# Patient Record
Sex: MALE | Race: WHITE | Employment: FULL TIME | URBAN - METROPOLITAN AREA
[De-identification: names, ages, dates, MRNs, and addresses within clinical notes are randomized per-mention and may not be internally consistent; named-entity substitution may affect disease eponyms.]

---

## 2019-01-31 ENCOUNTER — HOSPITAL ENCOUNTER (INPATIENT)
Facility: CLINIC | Age: 29
LOS: 4 days | Discharge: HOME | DRG: 885 | End: 2019-02-04
Attending: EMERGENCY MEDICINE | Admitting: PSYCHIATRY & NEUROLOGY
Payer: COMMERCIAL

## 2019-01-31 DIAGNOSIS — T41.292A: ICD-10-CM

## 2019-01-31 DIAGNOSIS — T42.4X2A INTENTIONAL CHLORDIAZEPOXIDE OVERDOSE, INITIAL ENCOUNTER (H): ICD-10-CM

## 2019-01-31 DIAGNOSIS — T50.902A INTENTIONAL DRUG OVERDOSE, INITIAL ENCOUNTER (H): ICD-10-CM

## 2019-01-31 DIAGNOSIS — F13.10 BENZODIAZEPINE ABUSE (H): ICD-10-CM

## 2019-01-31 DIAGNOSIS — T51.0X2A: ICD-10-CM

## 2019-01-31 DIAGNOSIS — R94.31 ABNORMAL ELECTROCARDIOGRAM: Primary | ICD-10-CM

## 2019-01-31 DIAGNOSIS — F32.A DEPRESSION, UNSPECIFIED DEPRESSION TYPE: ICD-10-CM

## 2019-01-31 DIAGNOSIS — F19.10 ANTIDEPRESSANT TYPE ABUSE, CONTINUOUS (H): ICD-10-CM

## 2019-01-31 DIAGNOSIS — F19.10 POLYSUBSTANCE ABUSE (H): ICD-10-CM

## 2019-01-31 LAB
ALBUMIN SERPL-MCNC: 4.4 G/DL (ref 3.4–5)
ALCOHOL BREATH TEST: 0.06 (ref 0–0.01)
ALP SERPL-CCNC: 61 U/L (ref 40–150)
ALT SERPL W P-5'-P-CCNC: 36 U/L (ref 0–70)
AMPHETAMINES UR QL SCN: POSITIVE
ANION GAP SERPL CALCULATED.3IONS-SCNC: 10 MMOL/L (ref 3–14)
APAP SERPL-MCNC: <2 MG/L (ref 10–20)
AST SERPL W P-5'-P-CCNC: 32 U/L (ref 0–45)
BARBITURATES UR QL: NEGATIVE
BASOPHILS # BLD AUTO: 0.1 10E9/L (ref 0–0.2)
BASOPHILS NFR BLD AUTO: 0.9 %
BENZODIAZ UR QL: POSITIVE
BILIRUB SERPL-MCNC: 0.8 MG/DL (ref 0.2–1.3)
BUN SERPL-MCNC: 19 MG/DL (ref 7–30)
CALCIUM SERPL-MCNC: 9.1 MG/DL (ref 8.5–10.1)
CANNABINOIDS UR QL SCN: NEGATIVE
CHLORIDE SERPL-SCNC: 106 MMOL/L (ref 94–109)
CO2 SERPL-SCNC: 24 MMOL/L (ref 20–32)
COCAINE UR QL: NEGATIVE
CREAT SERPL-MCNC: 1.2 MG/DL (ref 0.66–1.25)
DIFFERENTIAL METHOD BLD: NORMAL
EOSINOPHIL # BLD AUTO: 0.2 10E9/L (ref 0–0.7)
EOSINOPHIL NFR BLD AUTO: 2.1 %
ERYTHROCYTE [DISTWIDTH] IN BLOOD BY AUTOMATED COUNT: 13.3 % (ref 10–15)
ETHANOL UR QL SCN: POSITIVE
GFR SERPL CREATININE-BSD FRML MDRD: 81 ML/MIN/{1.73_M2}
GLUCOSE SERPL-MCNC: 64 MG/DL (ref 70–99)
HCT VFR BLD AUTO: 46.1 % (ref 40–53)
HGB BLD-MCNC: 16.3 G/DL (ref 13.3–17.7)
IMM GRANULOCYTES # BLD: 0 10E9/L (ref 0–0.4)
IMM GRANULOCYTES NFR BLD: 0.1 %
INTERPRETATION ECG - MUSE: NORMAL
INTERPRETATION ECG - MUSE: NORMAL
LYMPHOCYTES # BLD AUTO: 3.6 10E9/L (ref 0.8–5.3)
LYMPHOCYTES NFR BLD AUTO: 42.9 %
MCH RBC QN AUTO: 28.8 PG (ref 26.5–33)
MCHC RBC AUTO-ENTMCNC: 35.4 G/DL (ref 31.5–36.5)
MCV RBC AUTO: 82 FL (ref 78–100)
MONOCYTES # BLD AUTO: 0.6 10E9/L (ref 0–1.3)
MONOCYTES NFR BLD AUTO: 7.2 %
NEUTROPHILS # BLD AUTO: 3.9 10E9/L (ref 1.6–8.3)
NEUTROPHILS NFR BLD AUTO: 46.8 %
NRBC # BLD AUTO: 0 10*3/UL
NRBC BLD AUTO-RTO: 0 /100
OPIATES UR QL SCN: NEGATIVE
PLATELET # BLD AUTO: 289 10E9/L (ref 150–450)
POTASSIUM SERPL-SCNC: 3.6 MMOL/L (ref 3.4–5.3)
PROT SERPL-MCNC: 7.7 G/DL (ref 6.8–8.8)
RBC # BLD AUTO: 5.65 10E12/L (ref 4.4–5.9)
SALICYLATES SERPL-MCNC: <2 MG/DL
SODIUM SERPL-SCNC: 140 MMOL/L (ref 133–144)
WBC # BLD AUTO: 8.4 10E9/L (ref 4–11)

## 2019-01-31 PROCEDURE — 80329 ANALGESICS NON-OPIOID 1 OR 2: CPT | Performed by: EMERGENCY MEDICINE

## 2019-01-31 PROCEDURE — 93005 ELECTROCARDIOGRAM TRACING: CPT | Mod: 76 | Performed by: EMERGENCY MEDICINE

## 2019-01-31 PROCEDURE — 80320 DRUG SCREEN QUANTALCOHOLS: CPT | Performed by: EMERGENCY MEDICINE

## 2019-01-31 PROCEDURE — 82075 ASSAY OF BREATH ETHANOL: CPT | Performed by: EMERGENCY MEDICINE

## 2019-01-31 PROCEDURE — G0177 OPPS/PHP; TRAIN & EDUC SERV: HCPCS

## 2019-01-31 PROCEDURE — 99285 EMERGENCY DEPT VISIT HI MDM: CPT | Mod: 25 | Performed by: EMERGENCY MEDICINE

## 2019-01-31 PROCEDURE — H2032 ACTIVITY THERAPY, PER 15 MIN: HCPCS

## 2019-01-31 PROCEDURE — 25000132 ZZH RX MED GY IP 250 OP 250 PS 637: Performed by: PSYCHIATRY & NEUROLOGY

## 2019-01-31 PROCEDURE — 25000132 ZZH RX MED GY IP 250 OP 250 PS 637: Performed by: NURSE PRACTITIONER

## 2019-01-31 PROCEDURE — 12400001 ZZH R&B MH UMMC

## 2019-01-31 PROCEDURE — 85025 COMPLETE CBC W/AUTO DIFF WBC: CPT | Performed by: EMERGENCY MEDICINE

## 2019-01-31 PROCEDURE — 93010 ELECTROCARDIOGRAM REPORT: CPT | Performed by: INTERNAL MEDICINE

## 2019-01-31 PROCEDURE — 93010 ELECTROCARDIOGRAM REPORT: CPT | Mod: 76 | Performed by: EMERGENCY MEDICINE

## 2019-01-31 PROCEDURE — 93005 ELECTROCARDIOGRAM TRACING: CPT

## 2019-01-31 PROCEDURE — 80053 COMPREHEN METABOLIC PANEL: CPT | Performed by: EMERGENCY MEDICINE

## 2019-01-31 PROCEDURE — 93010 ELECTROCARDIOGRAM REPORT: CPT | Mod: Z6 | Performed by: EMERGENCY MEDICINE

## 2019-01-31 PROCEDURE — 80307 DRUG TEST PRSMV CHEM ANLYZR: CPT | Performed by: EMERGENCY MEDICINE

## 2019-01-31 PROCEDURE — 99223 1ST HOSP IP/OBS HIGH 75: CPT | Mod: AI | Performed by: PSYCHIATRY & NEUROLOGY

## 2019-01-31 PROCEDURE — 93005 ELECTROCARDIOGRAM TRACING: CPT | Performed by: EMERGENCY MEDICINE

## 2019-01-31 RX ORDER — HYDROXYZINE HYDROCHLORIDE 25 MG/1
25 TABLET, FILM COATED ORAL EVERY 4 HOURS PRN
Status: DISCONTINUED | OUTPATIENT
Start: 2019-01-31 | End: 2019-02-04 | Stop reason: HOSPADM

## 2019-01-31 RX ORDER — QUETIAPINE FUMARATE 50 MG/1
50-100 TABLET, FILM COATED ORAL
Status: DISCONTINUED | OUTPATIENT
Start: 2019-01-31 | End: 2019-02-04 | Stop reason: HOSPADM

## 2019-01-31 RX ORDER — ACETAMINOPHEN 325 MG/1
650 TABLET ORAL EVERY 4 HOURS PRN
Status: DISCONTINUED | OUTPATIENT
Start: 2019-01-31 | End: 2019-02-04 | Stop reason: HOSPADM

## 2019-01-31 RX ORDER — MULTIPLE VITAMINS W/ MINERALS TAB 9MG-400MCG
1 TAB ORAL DAILY
Status: DISCONTINUED | OUTPATIENT
Start: 2019-01-31 | End: 2019-02-04 | Stop reason: HOSPADM

## 2019-01-31 RX ORDER — TRAZODONE HYDROCHLORIDE 50 MG/1
50 TABLET, FILM COATED ORAL
Status: DISCONTINUED | OUTPATIENT
Start: 2019-01-31 | End: 2019-02-04 | Stop reason: HOSPADM

## 2019-01-31 RX ORDER — PHENOBARBITAL 64.8 MG/1
64.8 TABLET ORAL 3 TIMES DAILY
Status: DISCONTINUED | OUTPATIENT
Start: 2019-01-31 | End: 2019-02-01

## 2019-01-31 RX ORDER — BISACODYL 10 MG
10 SUPPOSITORY, RECTAL RECTAL DAILY PRN
Status: DISCONTINUED | OUTPATIENT
Start: 2019-01-31 | End: 2019-02-04 | Stop reason: HOSPADM

## 2019-01-31 RX ORDER — OLANZAPINE 10 MG/2ML
10 INJECTION, POWDER, FOR SOLUTION INTRAMUSCULAR
Status: DISCONTINUED | OUTPATIENT
Start: 2019-01-31 | End: 2019-02-04 | Stop reason: HOSPADM

## 2019-01-31 RX ORDER — ZOLPIDEM TARTRATE 10 MG/1
10 TABLET ORAL
Status: ON HOLD | COMMUNITY
End: 2019-02-04

## 2019-01-31 RX ORDER — BUPROPION HYDROCHLORIDE 150 MG/1
150 TABLET ORAL DAILY
Status: DISCONTINUED | OUTPATIENT
Start: 2019-01-31 | End: 2019-02-04 | Stop reason: HOSPADM

## 2019-01-31 RX ORDER — ALUMINA, MAGNESIA, AND SIMETHICONE 2400; 2400; 240 MG/30ML; MG/30ML; MG/30ML
30 SUSPENSION ORAL EVERY 4 HOURS PRN
Status: DISCONTINUED | OUTPATIENT
Start: 2019-01-31 | End: 2019-02-04 | Stop reason: HOSPADM

## 2019-01-31 RX ORDER — SERTRALINE HYDROCHLORIDE 25 MG/1
50 TABLET, FILM COATED ORAL DAILY
Status: ON HOLD | COMMUNITY
End: 2019-02-04

## 2019-01-31 RX ORDER — OLANZAPINE 10 MG/1
10 TABLET ORAL
Status: DISCONTINUED | OUTPATIENT
Start: 2019-01-31 | End: 2019-02-04 | Stop reason: HOSPADM

## 2019-01-31 RX ADMIN — NICOTINE POLACRILEX 4 MG: 4 GUM, CHEWING ORAL at 20:58

## 2019-01-31 RX ADMIN — MULTIPLE VITAMINS W/ MINERALS TAB 1 TABLET: TAB at 13:01

## 2019-01-31 RX ADMIN — BUPROPION HYDROCHLORIDE 150 MG: 150 TABLET, FILM COATED, EXTENDED RELEASE ORAL at 13:01

## 2019-01-31 RX ADMIN — PHENOBARBITAL 64.8 MG: 64.8 TABLET ORAL at 20:55

## 2019-01-31 RX ADMIN — PHENOBARBITAL 64.8 MG: 64.8 TABLET ORAL at 13:01

## 2019-01-31 ASSESSMENT — ACTIVITIES OF DAILY LIVING (ADL)
SWALLOWING: 0-->SWALLOWS FOODS/LIQUIDS WITHOUT DIFFICULTY
DRESS: 0-->INDEPENDENT
COGNITION: 0 - NO COGNITION ISSUES REPORTED
TRANSFERRING: 0-->INDEPENDENT
FALL_HISTORY_WITHIN_LAST_SIX_MONTHS: NO
BATHING: 0-->INDEPENDENT
TOILETING: 0-->INDEPENDENT
DRESS: STREET CLOTHES;INDEPENDENT
RETIRED_EATING: 0-->INDEPENDENT
AMBULATION: 0-->INDEPENDENT
RETIRED_COMMUNICATION: 0-->UNDERSTANDS/COMMUNICATES WITHOUT DIFFICULTY
HYGIENE/GROOMING: INDEPENDENT
ORAL_HYGIENE: INDEPENDENT

## 2019-01-31 ASSESSMENT — ENCOUNTER SYMPTOMS
FEVER: 0
ABDOMINAL PAIN: 0
DYSPHORIC MOOD: 1
SHORTNESS OF BREATH: 0

## 2019-01-31 NOTE — PROGRESS NOTES
"Initial Psychosocial Assessment  I have reviewed the chart, met with the patient, and developed Care Plan. Information for assessment was obtained from the patient and the patient's medical chart.      Presenting Problem: Tyler Bella is a 28 year old male who resents after overdose.  He states that he has been chronically depressed.  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  He states that his life has been very stressful lately.  He is currently going through a divorce with his wife, and he feels she is being nasty, trying to get money from him including alimony.  He is also reports that he is miller.  He states that he is a, \"strong man,\" and does body building.  He states that he has been using anabolic steroids for some time.  He uses twice weekly.  He also states for the last month he has been using drugs daily.  He states he uses cocaine daily, and alternates between opioids 1 day, and benzodiazepines the next.  He states that he drinks alcohol only on days that he uses benzodiazepines.  He says he is a high tolerance, typically drinks about 7 shots of hard liquor.  He reports that he hasn't used any opioids or cocaine in about a week. Tonight he states he was feeling depressed and suicidal.  He took approximately 4 mg of Klonopin and 100 mg of Valium at approximately midnight, approximately 2-1/2 hours prior to seeing him.  He states he also drank about 7 shots of liquor tonight.  He additionally states that he injected 70 mg of ketamine intramuscularly.  He states that he uses ketamine about twice a week, recreationally, gets it from, \"hereditable source.\"  He states that he feels, \"pretty good right now.\"  There was no vomiting and he denies any physical complaints at this time.  He does state this was a suicide attempt and he was trying to kill himself.  He states that his typical benzo use is 4 mg of Klonopin daily as well as one bar of Xanax.  He states he got the Valium " to try to wean himself the other benzos.  He states that he does not go through alcohol withdrawal when he seems from alcohol, though believes he would go through benzo withdrawal.  He states that he called his boyfriend tonight and told him that he overdosed, and his boyfriend encouraged him to tell his parents and come in for evaluation. - ED Provider Note (Makeda Garcia MD, 01/31/19)    History of Mental Health and Chemical Dependency: The patient reported that he has had a history of both CD use and treatment as well as mental health hospitalizations. The patient stated that he had been sober for a couple of years before he began using again around Thanksgiving. He stated that he last went to CD treatment in 2010. He reported that he completed [email or web address]. The patient also reported that he was last hospitalized in April of 2015 or 2016.     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 He stated that he has witnessed suicides in these jobs and feels that he may have some underlining PTSD from it.      Family/Living Situation: The patient reported that he is currently renting a room in [place]. The patient stated that both of his parents and older sister are supportive if him. He stated that he is currently going through a tough divorce process with wife but identifies as miller. He stated that the divorce is what has worsened his passive SI over the past few weeks. The patient has not children. He reported that his father has both depression and anxiety. He also stated that he has an aunt who may have Borderline Personality or Bipolar Disorder.      Educational Background: The patient reported that his highest level of education completed is a Master's Degree.      Financial Status: The patient is currently working as a therapist at [employer].      Legal Issues: The patient is currently  hospitalized voluntarily. The patient is currently going through a divorce with his wife but denies any other legal issues.      Ethnic/Cultural Issues: None.      Spiritual Orientation: None.       Service History: None.      Social Functioning (organization, interests): The patient reported that he is a competitive weight  in [employer] competitions.       Current Treatment Providers are:  Primary Care: None.   Psychiatrist: None. Patient reported that he would like one in the [facility] and he has no gender preference .   Therapist: None. Patient reported that he would like one in the [facility] and he has a gender preference of male.   : None.   Other Providers:    Social Service Assessment/Plan: CTC will explore options for follow up care and  provide a psychological assessment.Patient will be provided with a safe environment, medication management, as well as offered groups for coping skills.

## 2019-01-31 NOTE — PHARMACY-ADMISSION MEDICATION HISTORY
Admission medication history for the January 31, 2019 admission is complete.     Interview sources:  Patient, Epic (admission-outside meds)    Reliability of source: Patient appeared to be knowledgeable about his medications.     Medication compliance: moderate; Patient stated he takes the Zoloft regularly, but doesn't use the Ambien when he is using other substances.     Preferred Outpatient Pharmacy: Patient doesn't know at this time    Changes made to PTA medication list (reason)  Added: none  Deleted: none  Changed: Zoloft 25 mg to 50 mg (pharmacy dispensed 50 mg on 1/15/2019 for 30 day supply and patient confirmed this was his dose)    Additional medication history information:   Patient stated he uses a CPAP machine and protein powder when working out. He also stated he doesn't use the Ambien when he uses the other substances, but will probably need it every night now that he is not using other substances. Patient reported he has been taking Nexium that he purchased over-the-counter for the past few days for acid reflux.     Prior to Admission Medication List:  Prior to Admission medications    Medication Sig Last Dose Taking? Auth Provider   multivitamin, therapeutic with minerals (MULTI-VITAMIN) TABS Take 1 tablet by mouth daily Past Week Yes Radha Wilkes MD   sertraline (ZOLOFT) 50 MG tablet Take 50 mg by mouth daily 1/31/2019 Yes Reported, Patient   zolpidem (AMBIEN) 10 MG tablet Take 10 mg by mouth nightly as needed for sleep Past Week at time Yes Reported, Patient       Time spent: 20 minutes    Medication history completed by:   Marli Tyler, PharmD IV Student

## 2019-01-31 NOTE — PROGRESS NOTES
BEHAVIORAL TEAM DISCUSSION    Participants: Rachel Mena (CTC), Kalani Barrera (RN)  Progress: Continuing to assess.    Continued Stay Criteria/Rationale: Assessment, evaluation, and recommendations.   Medical/Physical: Please see medical notes.   Precautions:    Plan: CTC will complete the initial psychosocial assessment. Upon stabilization, the patient will be assessed for discharge.   Rationale for change in precautions or plan: None.

## 2019-01-31 NOTE — ED NOTES
ED to Behavioral Floor Handoff    SITUATION  Tyler Bella is a 28 year old male who speaks English and lives in a home with family members The patient arrived in the ED by private car from home with a complaint of Suicidal (OD on pills. Took 10mg valium about 10 pills, 70mg Ketamin IM, 4mg Klonopin, and Etoh. )  .The patient's current symptoms started/worsened  month(s) ago and during this time the symptoms have increased.   In the ED, pt was diagnosed with   Final diagnoses:   Intentional drug overdose, initial encounter (H)   Depression, unspecified depression type   Polysubstance abuse (H)        Initial vitals were: BP: 127/65  Pulse: 75  Heart Rate: 72  Temp: 96.1  F (35.6  C)  Resp: 16  Weight: 88 kg (194 lb)  SpO2: 100 %   --------  Is the patient diabetic? No   If yes, last blood glucose? --     If yes, was this treated in the ED? --  --------  Is the patient inebriated (ETOH) No or Impaired on other substances? No  MSSA done? N/A  Last MSSA score: --    Were withdrawal symptoms treated? N/A  Does the patient have a seizure history? No. If yes, date of most recent seizure--  --------  Is the patient patient experiencing suicidal ideation? reports the following suicide factors: attempted to OD this evening    Homicidal ideation? denies current or recent homicidal ideation or behaviors.    Self-injurious behavior/urges? denies current or recent self injurious behavior or ideation.  ------  Was pt aggressive in the ED No  Was a code called No  Is the pt now cooperative? Yes  -------  Meds given in ED: Medications - No data to display   Family present during ED course? Yes  Family currently present? No    BACKGROUND  Does the patient have a cognitive impairment or developmental disability? No  Allergies:   Allergies   Allergen Reactions     Effexor [Venlafaxine]      Was feeling faint and dizzy on this.    .   Social demographics are   Social History     Socioeconomic History     Marital status:       Spouse name: None     Number of children: None     Years of education: None     Highest education level: None   Social Needs     Financial resource strain: None     Food insecurity - worry: None     Food insecurity - inability: None     Transportation needs - medical: None     Transportation needs - non-medical: None   Occupational History     None   Tobacco Use     Smoking status: Former Smoker     Packs/day: 0.00     Last attempt to quit: 2016     Years since quittin.5     Smokeless tobacco: Never Used   Substance and Sexual Activity     Alcohol use: Yes     Alcohol/week: 0.0 oz     Comment: none     Drug use: No     Sexual activity: Yes   Other Topics Concern     None   Social History Narrative    Lives in [place]    Former smoker and had used marijuana in the past    Does drink alcohol    Girlfriend Khadijah        ASSESSMENT  Labs results   Labs Ordered and Resulted from Time of ED Arrival Up to the Time of Departure from the ED   DRUG ABUSE SCREEN 6 CHEM DEP URINE ([facility]) - Abnormal; Notable for the following components:       Result Value    Amphetamine Qual Urine Positive (*)     Benzodiazepine Qual Urine Positive (*)     Ethanol Qual Urine Positive (*)     All other components within normal limits   COMPREHENSIVE METABOLIC PANEL - Abnormal; Notable for the following components:    Glucose 64 (*)     All other components within normal limits   ALCOHOL BREATH TEST POCT - Abnormal; Notable for the following components:    Alcohol Breath Test 0.059 (*)     All other components within normal limits   CBC WITH PLATELETS DIFFERENTIAL   ACETAMINOPHEN LEVEL   SALICYLATE LEVEL      Imaging Studies: No results found for this or any previous visit (from the past 24 hour(s)).   Most recent vital signs /62   Pulse 75   Temp 96.1  F (35.6  C) (Oral)   Resp 21   Wt 88 kg (194 lb)   SpO2 95%   BMI 29.50 kg/m     Abnormal labs/tests/findings requiring intervention:---   Pain control: pt had none  Nausea  control: pt had none    RECOMMENDATION  Are any infection precautions needed (MRSA, VRE, etc.)? No If yes, what infection? --  ---  Does the patient have mobility issues? independently. If yes, what device does the pt use? ---  ---  Is patient on 72 hour hold or commitment? No If on 72 hour hold, have hold and rights been given to patient? N/A  Are admitting orders written if after 10 p.m. ?N/A  Tasks needing to be completed:---     Griselda Manzo   [facility]-- [phone] [place] [facility]   [phone] [facility] ED

## 2019-01-31 NOTE — PROGRESS NOTES
Pt attended the morning OT wellness group.  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  While friendly and cooperative, pt shared he wished he didn't have to be here until Monday (he is under impression he will be discharged Monday), and stated it would be helpful if he could just get back to work.    Through discussion, pt came to understand that though this is not where he necessarily wants to be, due to  stressors, chemical use, and general instability, this may be where he needs to be for personal safety and stability.  Pt spoke of coping skills, supports, and long term goals, and seemed to slowly increase comfort level as group progressed.

## 2019-01-31 NOTE — ED PROVIDER NOTES
"  History     Chief Complaint   Patient presents with     Suicidal     OD on pills. Took 10mg valium about 10 pills, 70mg Ketamin IM, 4mg Klonopin, and Etoh.      HPI  Tyler Bella is a 28 year old male who resents after overdose.  He states that he has been chronically depressed.  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  He states that his life has been very stressful lately.  He is currently going through a divorce with his wife, and he feels she is being nasty, trying to get money from him including alimony.  He is also reports that he is miller.  He states that he is a, \"strong man,\" and does body building.  He states that he has been using anabolic steroids for some time.  He uses twice weekly.  He also states for the last month he has been using drugs daily.  He states he uses cocaine daily, and alternates between opioids 1 day, and benzodiazepines the next.  He states that he drinks alcohol only on days that he uses benzodiazepines.  He says he is a high tolerance, typically drinks about 7 shots of hard liquor.  He reports that he hasn't used any opioids or cocaine in about a week. Tonight he states he was feeling depressed and suicidal.  He took approximately 4 mg of Klonopin and 100 mg of Valium at approximately midnight, approximately 2-1/2 hours prior to seeing him.  He states he also drank about 7 shots of liquor tonight.  He additionally states that he injected 70 mg of ketamine intramuscularly.  He states that he uses ketamine about twice a week, recreationally, gets it from, \"hereditable source.\"  He states that he feels, \"pretty good right now.\"  There was no vomiting and he denies any physical complaints at this time.  He does state this was a suicide attempt and he was trying to kill himself.  He states that his typical benzo use is 4 mg of Klonopin daily as well as one bar of Xanax.  He states he got the Valium to try to wean himself the other benzos.  He states " "that he does not go through alcohol withdrawal when he seems from alcohol, though believes he would go through benzo withdrawal.  He states that he called his boyfriend tonight and told him that he overdosed, and his boyfriend encouraged him to tell his parents and come in for evaluation.    Past Medical History:   Diagnosis Date     ADHD (attention deficit hyperactivity disorder) 9/15/2011     Anxiety 2011     Depression 2011     Drug abuse (H) 2011     Myoclonus 7/15/2011     Parkinsonism (H) 2011     Tremor 7/15/2011       Past Surgical History:   Procedure Laterality Date     APPENDECTOMY OPEN       FOOT SURGERY       TONSILLECTOMY         Family History   Problem Relation Age of Onset     Anxiety Disorder Father      Alcohol/Drug Maternal Grandfather         alcoholic     Other - See Comments Maternal Grandfather         mobility issues but is \"old\"     Coronary Artery Disease Maternal Grandfather      Dementia Paternal Grandfather      Other - See Comments Paternal Grandfather         mobility issues, tremor and dementia     Alcohol/Drug Paternal Aunt         she is also shakey     Diabetes No family hx of      Colon Cancer No family hx of      Prostate Cancer No family hx of        Social History     Tobacco Use     Smoking status: Former Smoker     Packs/day: 0.00     Last attempt to quit: 2016     Years since quittin.5     Smokeless tobacco: Never Used   Substance Use Topics     Alcohol use: Yes     Alcohol/week: 0.0 oz     Comment: none         I have reviewed the Medications, Allergies, Past Medical and Surgical History, and Social History in the Epic system.    Review of Systems   Constitutional: Negative for fever.   Respiratory: Negative for shortness of breath.    Cardiovascular: Negative for chest pain.   Gastrointestinal: Negative for abdominal pain.   Psychiatric/Behavioral: Positive for dysphoric mood.   All other systems reviewed and are negative.      Physical Exam "   BP: 127/65  Pulse: 75  Heart Rate: 72  Temp: 96.1  F (35.6  C)  Resp: 16  Weight: 88 kg (194 lb)  SpO2: 100 %      Physical Exam   Constitutional: No distress.   HENT:   Head: Atraumatic.   Mouth/Throat: Oropharynx is clear and moist.   Eyes: Pupils are equal, round, and reactive to light. No scleral icterus.   Cardiovascular: Normal heart sounds and intact distal pulses.   Pulmonary/Chest: Breath sounds normal. No respiratory distress.   Abdominal: Soft. There is no tenderness.   Musculoskeletal: He exhibits no edema or tenderness.   Skin: Skin is warm. No rash noted. He is not diaphoretic.       ED Course        Procedures             EKG Interpretation:      Interpreted by Makeda Henderson  Time reviewed: 0225  Symptoms at time of EKG: None   Rhythm: normal sinus   Rate: 66  Axis: Normal  Ectopy: none  Conduction: QRS widing (116 ms)  ST Segments/ T Waves: 1 mm of ST elevation in V4 and V5, with deep T wave inversions in V4 through V6  Q Waves: none  Comparison to prior: Likely increased evidence of LVH, new QRS widening, new/worsened mild ST elevation in V4 and V5    Clinical Impression: Abnormal EKG         EKG Interpretation:      Interpreted by Makeda Henderson  Time reviewed:0533  Symptoms at time of EKG: None   Rhythm: Normal sinus   Rate: 71  Axis: Normal  Ectopy: None  Conduction: prolonged  ms  ST Segments/ T Waves: Slight ST elevation in V4 and V5, deep T wave inversions V2 through V6, T wave inversion II, III, aVF  Q Waves: None  Comparison to prior: New T wave inversions compared with earlier EKG    Clinical Impression: Abnormal EKG                    Critical Care time:  none             Labs Ordered and Resulted from Time of ED Arrival Up to the Time of Departure from the ED   DRUG ABUSE SCREEN 6 CHEM DEP URINE ([facility]) - Abnormal; Notable for the following components:       Result Value    Amphetamine Qual Urine Positive (*)     Benzodiazepine Qual Urine Positive (*)     Ethanol Qual  Urine Positive (*)     All other components within normal limits   COMPREHENSIVE METABOLIC PANEL - Abnormal; Notable for the following components:    Glucose 64 (*)     All other components within normal limits   ALCOHOL BREATH TEST POCT - Abnormal; Notable for the following components:    Alcohol Breath Test 0.059 (*)     All other components within normal limits   CBC WITH PLATELETS DIFFERENTIAL   ACETAMINOPHEN LEVEL   SALICYLATE LEVEL            Assessments & Plan (with Medical Decision Making)   Patient presents with an overdose that he states was a suicide attempt.  He was asymptomatic upon arrival.  He took a large dose of benzodiazepines, though he has been a chronic benzo abuser.  He is nonverbal sedated.  Tylenol salicylate levels were drawn and negative.  EKG was done which showed slightly prolonged QRS with a QRS duration of 116 ms. This is longer than QRS duration on previous EKG.  However, he denied overdose on any of his other medications.  He disclosed a lot of drug use to me, and it seemed hard to believe that he would have lied about this.  I did speak with the poison center, and they felt that his presentation was not consistent with other overdose, such as would cause QRS prolongation.  They felt that he would be somnolent, have tachycardia, etc.  However, they did recommend monitoring for at least 4 hours postingestion, repeating EKG to assess for any change in the QRS duration.  This was done.  QRS duration on repeat EKG remains 116 ms.  Of note, the patient has apparent left ventricular hypertrophy on EKG, has some deep T wave inversions laterally on initial EKG.  He also has slight ST elevation in V4 through V6, though has no symptoms at all.  I did speak with the cardiology fellow, Dr. Jenkins, who reviewed the EKG.  He felt that the ST elevation was due to early repolarization.  He did not think that the EKG was at all concerning.  On repeat EKG, the patient has similar findings, but also has T  wave inversions now noted inferiorly as well as in leads V2 and V3.  I did speak with Dr. Jenkins again.  He feels that this EKG is fairly consistent with the previous EKG, and likely the variation from the first of the second EKG today was due to lead placement.  The patient remains completely asymptomatic and has no chest pain or shortness of breath.  Dr. Jenkins did not feel that any further acute evaluation was required, and that this is not likely an acute finding.  Again, this does not seem likely related to his ingestion.  I did speak with the patient, and did recommend an outpatient echocardiogram.  He has been abusing many substances, including anabolic steroids.  He has been using anabolic steroids for about a year.  I do think echo for further evaluation as an outpatient is appropriate.    The patient remains medically stable, and he will be admitted to suicidality.  He voluntarily accepts inpatient psychiatric admission.    I have reviewed the nursing notes.    I have reviewed the findings, diagnosis, plan and need for follow up with the patient.    Dictation Disclaimer: Some of this Note has been completed with voice-recognition dictation software. Although errors are generally corrected real-time, there is the potential for a rare error to be present in the completed chart.         Medication List      There are no discharge medications for this visit.         Final diagnoses:   Intentional drug overdose, initial encounter (H)   Depression, unspecified depression type   Polysubstance abuse (H)       1/31/2019   [facility], [place], EMERGENCY DEPARTMENT     Makeda Henderson MD  01/31/19 0614

## 2019-01-31 NOTE — PROGRESS NOTES
01/31/19 0928   Patient Belongings   Did you bring any home meds/supplements to the hospital?  No   Patient Belongings remains with patient;locker;sent to security per site process   Patient Belongings Remaining with Patient clothing   Patient Belongings Put in Hospital Secure Location (Security or Locker, etc.) cell phone/electronics;clothing;shoes;wallet;watch   Belongings Search Yes   Clothing Search Yes   Second Staff Hernán S     Kept with pt:  White t-shirt and blue jeans    Kept in pt's locker:  Smart watch, iPhone, hair tie, black boots, wallet, black hoodie, and black jacket    Sent to security:   [employer] Visa debit card: [id number], Visa debit card: [id number]    Addendum:  Pt visitor brought in clothes, hair brush, deodorant, and Crocs shoes 1/31/19    .A               Admission:  I am responsible for any personal items that are not sent to the safe or pharmacy.  [facility] is not responsible for loss, theft or damage of any property in my possession.    Signature:  _________________________________ Date: _______  Time: _____                                              Staff Signature:  ____________________________ Date: ________  Time: _____      2nd Staff person, if patient is unable/unwilling to sign:    Signature: ________________________________ Date: ________  Time: _____     Discharge:  [facility] has returned all of my personal belongings:    Signature: _________________________________ Date: ________  Time: _____                                          Staff Signature:  ____________________________ Date: ________  Time: _____

## 2019-01-31 NOTE — PROGRESS NOTES
New patient arrived on the unit from the ED.  He had taken an right eye of Valium, Ketamine, and klonopin along with alcohol.  He then called his boyfriend and ended up in the ED.  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  He also doesn't want to go to chemical dependency treatment.  Writer told him that it would not be surprising if CD treatment of some kind were recommended with his chemical dependency use.  He said he would not consider inpatient CD tx.  His stressors are getting a divorce from his wife who tells him how awful he is, just moving, relationship concerns and work concerns.  In addition to using benzodiazepines, cocaine, methamphetamines and alcohol he uses anabolic steroids.  He was cooperative with all admission procedures.  He doesn't smoke but would like nicotine gum.  He does not appear to have any physical problems.

## 2019-01-31 NOTE — PROGRESS NOTES
Brief Medicine Note    Contacted by nursing regarding patient's abnormal ECG with widened QRS interval of 116.     Per chart review, patient is s/p ingestion of 10 pills of Valium, Ketamine 70mg injection and Klonopin 4mg this AM at 0000 stated to be a suicide attempt. Per chart review, ED contacted poison control. Tylenol salicylate levels were drawn and negative.  EKG was done which showed slightly prolonged QRS with a QRS duration of 116 ms. This is longer than QRS duration on previous EKG.  However, he denied overdose on any of his other medications.  Poison center felt that his presentation was not consistent with other medication overdoses, such as would cause QRS prolongation.  Poison control felt he would be somnolent, have tachycardia, etc. Poison control recommended monitoring for at least 4 hours postingestion, repeating EKG to assess for any change in the QRS duration.  Of note, the patient's initial EKG has apparent left ventricular hypertrophy on EKG, has some deep T wave inversions laterally on initial EKG.  He also has slight ST elevation in V4 through V6, though has no symptoms at all. ED provider discussed with Cardiology fellow, Dr. Jenkins, who reviewed the EKG.  He felt that the ST elevation was due to early repolarization.  He did not think that the EKG was at all concerning.  On repeat EKG, the patient has similar findings, but also has T wave inversions now noted inferiorly as well as in leads V2 and V3. ED provider did speak with Dr. Jenkins again.  He feels that this EKG is fairly consistent with the previous EKG, and likely the variation from the first of the second EKG today was due to lead placement.  The patient was asymptomatic in the ED without chest pain or shortness of breath.  Dr. Jenkins did not feel that any further acute evaluation was required at that time, and that this is not likely an acute finding.      After evaluation of the ECG; I personally called poison control at 1600. They  discussed that they do not feel the ECG findings are consistent with the ingestion. They recommended further investigation of the ECG findings.     I discussed the case with Cardiology fellow on call this PM. Cardiology discussed that this finding can be seen in otherwise young males if they have LVF/enlarged heart due being physically fit. Reviewed PMHx and blood work done today as well. Cardiology recommends obtaining ECHO tomorrow for further assessment. No acute work-up necessary this evening.     In the meantime, please order STAT ECG if patient has acute chest pain/SOB, dizziness, lightheadedness overnight.     Milly Turpin PA-C  Hospitalist Service  Pager [phone]

## 2019-02-01 ENCOUNTER — APPOINTMENT (OUTPATIENT)
Dept: CARDIOLOGY | Facility: CLINIC | Age: 29
DRG: 885 | End: 2019-02-01
Payer: COMMERCIAL

## 2019-02-01 LAB
CHOLEST SERPL-MCNC: 148 MG/DL
HDLC SERPL-MCNC: 41 MG/DL
INTERPRETATION ECG - MUSE: NORMAL
LDLC SERPL CALC-MCNC: 89 MG/DL
NONHDLC SERPL-MCNC: 107 MG/DL
TRIGL SERPL-MCNC: 91 MG/DL

## 2019-02-01 PROCEDURE — 93306 TTE W/DOPPLER COMPLETE: CPT

## 2019-02-01 PROCEDURE — 99232 SBSQ HOSP IP/OBS MODERATE 35: CPT | Performed by: PHYSICIAN ASSISTANT

## 2019-02-01 PROCEDURE — H2032 ACTIVITY THERAPY, PER 15 MIN: HCPCS

## 2019-02-01 PROCEDURE — 99207 ZZC CONSULT E&M CHANGED TO SUBSEQUENT LEVEL: CPT | Performed by: PHYSICIAN ASSISTANT

## 2019-02-01 PROCEDURE — 36415 COLL VENOUS BLD VENIPUNCTURE: CPT | Performed by: PSYCHIATRY & NEUROLOGY

## 2019-02-01 PROCEDURE — 93306 TTE W/DOPPLER COMPLETE: CPT | Mod: 26 | Performed by: INTERNAL MEDICINE

## 2019-02-01 PROCEDURE — 25000132 ZZH RX MED GY IP 250 OP 250 PS 637: Performed by: PSYCHIATRY & NEUROLOGY

## 2019-02-01 PROCEDURE — G0177 OPPS/PHP; TRAIN & EDUC SERV: HCPCS

## 2019-02-01 PROCEDURE — 99232 SBSQ HOSP IP/OBS MODERATE 35: CPT | Performed by: PSYCHIATRY & NEUROLOGY

## 2019-02-01 PROCEDURE — 25000132 ZZH RX MED GY IP 250 OP 250 PS 637: Performed by: NURSE PRACTITIONER

## 2019-02-01 PROCEDURE — 12400001 ZZH R&B MH UMMC

## 2019-02-01 PROCEDURE — 80061 LIPID PANEL: CPT | Performed by: PSYCHIATRY & NEUROLOGY

## 2019-02-01 RX ORDER — PHENOBARBITAL 32.4 MG/1
32.4 TABLET ORAL 3 TIMES DAILY
Status: DISCONTINUED | OUTPATIENT
Start: 2019-02-01 | End: 2019-02-04 | Stop reason: HOSPADM

## 2019-02-01 RX ADMIN — NICOTINE POLACRILEX 4 MG: 4 GUM, CHEWING ORAL at 11:14

## 2019-02-01 RX ADMIN — ACETAMINOPHEN 650 MG: 325 TABLET, FILM COATED ORAL at 11:14

## 2019-02-01 RX ADMIN — NICOTINE POLACRILEX 4 MG: 4 GUM, CHEWING ORAL at 09:03

## 2019-02-01 RX ADMIN — MAGNESIUM HYDROXIDE 30 ML: 400 SUSPENSION ORAL at 20:07

## 2019-02-01 RX ADMIN — NICOTINE POLACRILEX 4 MG: 4 GUM, CHEWING ORAL at 21:41

## 2019-02-01 RX ADMIN — PHENOBARBITAL 64.8 MG: 64.8 TABLET ORAL at 09:03

## 2019-02-01 RX ADMIN — NICOTINE POLACRILEX 4 MG: 4 GUM, CHEWING ORAL at 14:06

## 2019-02-01 RX ADMIN — SERTRALINE HYDROCHLORIDE 50 MG: 50 TABLET ORAL at 09:03

## 2019-02-01 RX ADMIN — QUETIAPINE FUMARATE 100 MG: 50 TABLET ORAL at 21:41

## 2019-02-01 RX ADMIN — BUPROPION HYDROCHLORIDE 150 MG: 150 TABLET, FILM COATED, EXTENDED RELEASE ORAL at 09:03

## 2019-02-01 RX ADMIN — QUETIAPINE FUMARATE 100 MG: 50 TABLET ORAL at 00:08

## 2019-02-01 RX ADMIN — NICOTINE POLACRILEX 4 MG: 4 GUM, CHEWING ORAL at 20:07

## 2019-02-01 RX ADMIN — PHENOBARBITAL 32.4 MG: 32.4 TABLET ORAL at 20:28

## 2019-02-01 RX ADMIN — MULTIPLE VITAMINS W/ MINERALS TAB 1 TABLET: TAB at 09:03

## 2019-02-01 RX ADMIN — NICOTINE POLACRILEX 4 MG: 4 GUM, CHEWING ORAL at 16:55

## 2019-02-01 RX ADMIN — PHENOBARBITAL 64.8 MG: 64.8 TABLET ORAL at 14:06

## 2019-02-01 ASSESSMENT — ACTIVITIES OF DAILY LIVING (ADL)
HYGIENE/GROOMING: INDEPENDENT
ORAL_HYGIENE: INDEPENDENT
HYGIENE/GROOMING: INDEPENDENT
LAUNDRY: WITH SUPERVISION
LAUNDRY: WITH SUPERVISION
DRESS: INDEPENDENT
ORAL_HYGIENE: INDEPENDENT
DRESS: INDEPENDENT

## 2019-02-01 NOTE — PROGRESS NOTES
Brief Medicine Note    Tyler Bella is a 27yo male with a hx of tremor, depression, anxiety, ADHD and hx of drug abuse who presented to the ED on 1/31/19 after a suicide attempt by ingesting Klonipin, Valium, as well as Ketamine. Internal Medicine was asked to see the patient in regards for ECG changes. Case was discussed with poison control and cardiology was in the ED, as well as internal medicine spoke with poison control and cardiology yesterday evening 1/31. See my previous note on 1/31.     Today, patient denies any hx of or current chest pain, shortness of breath, SANCHEZ, orthopnea, PND, peripheral edema, dizziness or lightheadedness. He denies any syncope during exercise. He denies any family hx of heart disease or sudden cardiac death. He states he has occasional elevated BP readings, but has never required BP medications in the past. He denies any hx of HLD or DM. Previously used chewing tobacco, but reports he has quit 2 years ago.     Today's vital signs, medications, and nursing notes were reviewed.   ROUTINE IP LABS (Last four results)  Recent Labs   Lab 01/31/19  0244      POTASSIUM 3.6   CHLORIDE 106   CO2 24   ANIONGAP 10   GLC 64*   BUN 19   CR 1.20   PIETRO 9.1   PROTTOTAL 7.7   ALBUMIN 4.4   BILITOTAL 0.8   ALKPHOS 61   AST 32   ALT 36     Recent Labs   Lab 01/31/19  0244   WBC 8.4   RBC 5.65   HGB 16.3   HCT 46.1   MCV 82   MCH 28.8   MCHC 35.4   RDW 13.3        No lab results found in last 7 days.     Glucose Values Latest Ref Rng & Units 1/31/2019   Bedside Glucose (mg/dl )  - --   GLUCOSE 70 - 99 mg/dL 64(L)   Some recent data might be hidden        All labs personally reviewed in [facility].  See A&P for additional results.     Unresulted Labs Ordered in the Past 30 Days of this Admission     No orders found for last 61 day(s).           ECHO complete from 2/1/19:   Interpretation Summary  Global and regional left ventricular function is normal with an EF of 60-65%.  The right  ventricle is normal size.  Global right ventricular function is normal.  Normal valves.  No pericardial effusion is present.  Previous study not available for comparison.      /79 (BP Location: Right arm)   Pulse 72   Temp 97.7  F (36.5  C) (Oral)   Resp 16   Wt 84.8 kg (187 lb)   SpO2 97%   BMI 28.43 kg/m    GENERAL: Alert and oriented x 3. Appears comfortable. Answering questions appropriately. He is a healthy, fit appearing male.   HEENT: Anicteric sclera. Mucous membranes moist.   CV: RRR. S1, S2. No murmurs appreciated.   RESPIRATORY: Effort normal on room air. Lungs CTAB with no wheezing, rales, rhonchi.   GI: Abdomen soft and non distended, bowel sounds present. No tenderness, rebound, guarding.   NEUROLOGICAL: No focal deficits. Moves all extremities.   EXTREMITIES: No peripheral edema. Intact bilateral pedal pulses.   SKIN: No jaundice. No rashes.     A/P:  Abnormal ECG changes:   -ECHO complete today was normal with LVEF 60-65%; no wall motion abnormalities or valvular disease  -DW Cardiology on 1/31; like the ECG changes are due to early repolarization which can be seen in young fit males; pt is asymptomatic.  -Patient cleared by poison control 1/31   -Will have patient f/u with Cardiology and PCP as outpatient   -No further acute work-up necessary   -Please order STAT ECG if patient has acute CP, SOB, dizziness or lightheadedness     No further medical intervention is required at this time. Medicine signing off. Please feel free to call with any questions.     Milly Turpin PA-C  Hospitalist Service  Pager [phone]

## 2019-02-01 NOTE — CONSULTS
Please see previous consult and follow-up notes from 1/31 and 2/1. This note was made to fulfill consult order.     Milly Turpin PA-C  Hospitalist Service  Pager [phone]

## 2019-02-01 NOTE — PROGRESS NOTES
Patient attended a structured psycho education group for 1 hour.  He participated appropriately by discussing coping skills and other aspects of good self care.

## 2019-02-01 NOTE — PROGRESS NOTES
"[facility], [place]   Psychiatric Progress Note        Interim History:   The patient's care was discussed with the treatment team during the daily team meeting and/or staff's chart notes were reviewed.  Staff report patient spent more time outside of his room. He reported that Phenobarbital was helpful with benzo withdrawal, said that he was right not trying to taper himself off benzodiazepines on his own, but coming to the hospital. Denied presence of Suicidal ideation, repeatedly declined suggestion to go to CD treatment: \"I could do this in the past, I can do this now\". Admitted that he didn't want his work place know about his mental health/CD problems (patient is a licensed counselor). He is willing to seek CD counseling, Indicated that he would like to be discharged from this hospital early Monday, so, he could be on time at his work place.          Medications:       buPROPion  150 mg Oral Daily     multivitamin w/minerals  1 tablet Oral Daily     PHENobarbital  32.4 mg Oral TID     sertraline  50 mg Oral Daily          Allergies:     Allergies   Allergen Reactions     Effexor [Venlafaxine]      Was feeling faint and dizzy on this.           Labs:     Recent Results (from the past 24 hour(s))   Lipid panel reflex to direct LDL    Collection Time: 02/01/19  7:45 AM   Result Value Ref Range    Cholesterol 148 <200 mg/dL    Triglycerides 91 <150 mg/dL    HDL Cholesterol 41 >39 mg/dL    LDL Cholesterol Calculated 89 <100 mg/dL    Non HDL Cholesterol 107 <130 mg/dL          Psychiatric Examination:     /79 (BP Location: Right arm)   Pulse 72   Temp 97.7  F (36.5  C) (Oral)   Resp 16   Wt 84.8 kg (187 lb)   SpO2 97%   BMI 28.43 kg/m    Weight is 187 lbs 0 oz  Body mass index is 28.43 kg/m .  Orthostatic Vitals       Most Recent      Sitting Orthostatic /72 01/31 0900    Sitting Orthostatic Pulse (bpm) 87 01/31 0900    Standing Orthostatic /74 02/01 0847    " Standing Orthostatic Pulse (bpm) 92 02/01 0847            Appearance: awake, alert  Attitude:  cooperative  Eye Contact:  good  Mood:  anxious  Affect:  appropriate and in normal range  Speech:  clear, coherent  Psychomotor Behavior:  no evidence of tardive dyskinesia, dystonia, or tics  Throught Process:  logical and linear  Associations:  no loose associations  Thought Content:  no evidence of suicidal ideation or homicidal ideation and no evidence of psychotic thought  Insight:  fair  Judgement:  fair  Oriented to:  time, person, and place  Attention Span and Concentration:  intact  Recent and Remote Memory:  intact    Clinical Global Impressions  First: 5/4 2/1/2019      Most recent:            Precautions:     Behavioral Orders   Procedures     Code 1 - Restrict to Unit     Routine Programming     As clinically indicated     Status 15     Every 15 minutes.     Suicide precautions     Patients on Suicide Precautions should have a Combination Diet ordered that includes a Diet selection(s) AND a Behavioral Tray selection for Safe Tray - with utensils, or Safe Tray - NO utensils            DIagnoses:     Major depressive disorder, recurrent, severe, rule out substance-induced mood disorder with depressive features.  Polysubstance dependence (benzodiazepines, alcohol).  Rule out opiate use disorder.  Rule out amphetamines, rule out stimulant use disorder (amphetamines).          Plan:   Will decrease Phenobarbital. There is a good chance that patient would need to be discharged on Phenobarbital to continue taper at home. I informed him that I strongly recommend him to leave only after he sees doctor on Monday and to ask to be seen first. He promised to do that, however, if demands to leave over weekend, has not signs of withdrawal and contracts for safety, should be discharged AMA.

## 2019-02-01 NOTE — PROGRESS NOTES
Writer spoke with the patient regarding discharge and his discharge appointments. The patient's AVS has been completed.

## 2019-02-01 NOTE — H&P
"Admitted:     01/31/2019      The patient was seen for 70 minutes on the 01/31/2019, greater than 50% of the time was spent in counseling and coordinating care, clarifying diagnostic prognostic issues, presence of support in the community.      CHIEF COMPLAINT AND REASON FOR ADMISSION:  The patient is a 28-year-old  male going through a tough period in his life.  He is going through a divorce with his wife.  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  Reports work-related issues and chemical addiction issues.  He reports that he has been using Valium, ketamine, Klonopin along with alcohol.  He said that at times he uses opiates because he does not want to become addicted to benzodiazepines (?!).  He reports that he also uses anabolic steroids because he is a . He reports that he is officially prescribed Ambien 10 mg, Zoloft 50 mg daily by a prescriber whose first name is Ashlie. He does not remember the full name.  He says that he used to be on a higher dose of Zoloft but it caused side effects.  He also has a therapist, but does not see one now.  He reports that stresses became so severe that he finally overdosed on a combination of Klonopin, ketamine, and alcohol.  He then called his boyfriend and then ended up in the Emergency Department.      PAST PSYCHIATRIC HISTORY:  The patient reports that he had 1 previous psychiatric hospitalization in 04/2016. He was hospitalized after a suicide attempt in the context of benzodiazepine, alcohol use, and stress.  He reports taking a lot of benzos, approximately 8 mg of Xanax, 10 mg of Valium, and temazepam daily.  Today, the patient describes his daily use of benzodiazepines at 6-10 mg of Xanax or equivalent of Xanax per day.  He reports a lot of social anxiety, fears that nothing will ever get better.  He reports chemical dependency treatment program at [facility], which preceded his 4 years of sobriety.  It is " important to mention that he does not want to go into a chemical dependency treatment program now, neither inpatient nor outpatient.  He reports that he has a history of self-injurious behavior such as superficial cutting and also stabbing himself.  He tried a number of different medication such as Celexa, Wellbutrin, Remeron, Zoloft*.  He has not been taking BuSpar, gabapentin, Minipress and neuroleptic  medicines.  The patient reports a history of shakes, but no seizures or delirium tremens.       FAMILY AND SOCIAL HISTORY:  The patient reports that he is currently renting a room in [place].  He says that both of his parents and older sister are supportive of him.  He and his wife do not have children.  He reports that his father has both depression, anxiety, and an aunt may have borderline personality disorder or bipolar affective disorder.  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  He works as a therapist at [employer].       PAST MEDICAL HISTORY:  Significant for Parkinsonism.  The patient was not given an exact diagnosis for his tremors.        ALLERGIES:  HE REPORTED BEING ALLERGIC TO EFFEXOR.      HOME MEDICATIONS:   1.  Multivitamins with minerals 1 tablet daily.   2.  Zoloft 50 mg daily.   3.  Ambien 10 mg at bedtime p.r.n. for sleep.      HISTORY AND SOCIAL HISTORY:  The patient reports history of shakes on the next day after going through withdrawal, but no seizures.       REVIEW OF SYSTEMS:  For the 12-point review of systems, please refer to Dr. Makeda Henderson's note from 1/31/2019 reviewed this note and is agreeable with it.      VITAL SIGNS:  Temperature 97.8, blood pressure 127/72, heart rate 87.      MENTAL STATUS EXAMINATION:  The patient is a  male, bearded of short stature, casually and neatly dressed.  Maintains fair eye contact.  Speech is spontaneous and productive, but slightly  monotone.  He reports above-mentioned suicidal thoughts, denied homicidal thoughts.  There was no evidence of psychosis, hypomania or betsy.  He described his mood as sad.  Affect was constricted, congruent with mood.  Thought process is linear, logical, and goal directed.  He was alert and oriented x3.  Fund of knowledge was average with proper usage of vocabulary.  Ability to focus and concentrate, immediate short and long-term memories were intact.  The patient did not show psychomotor agitation or psychomotor retardation, maintained normal posture and gait.      IMPRESSION:  Major depressive disorder, recurrent, severe, rule out substance-induced mood disorder with depressive features.  Polysubstance dependence (benzodiazepines, alcohol).  Rule out opiate use disorder.  Rule out amphetamines, rule out stimulant use disorder (amphetamines).      TREATMENT PLAN:  The patient presents with quite significant depression precipitated by going through a divorce and also significant chemical addiction issues.  He, however, was adamant about not going into a chemical dependency treatment program.  He agreed to be put on phenobarbital for benzodiazepine withdrawal. He will be continued on the same dose of Zoloft.  I strongly do not recommend him to be continued on Ambien, suggested to start Seroquel 100-150 mg at bedtime p.r.n. for sleep.  He agreed with my suggestion.  I will talk to him again about going into CD treatment tomorrow.         OSBALDO BLEVINS MD             D: 2019   T: 2019   MT: COREY      Name:     HARIS WOODARD   MRN:      [id number]        Account:      [id number]   :      1990        Admitted:     2019                   Document: [id number]

## 2019-02-01 NOTE — PROGRESS NOTES
Attended 1 of 2 music therapy groups. Intervention focused on improving mood. Pt actively participated in music game intervention and appeared content. Was social with peers and writer. Appeared calm throughout group. 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

## 2019-02-01 NOTE — PROGRESS NOTES
BEHAVIORAL TEAM DISCUSSION    Participants: Rachel Mena (CTC), Kalani Barrera (RN)  Progress: Continuing to assess.   Continued Stay Criteria/Rationale: Assessment, evaluation, and recommendations.   Medical/Physical: Please see medical notes.   Precautions:   Behavioral Orders   Procedures     Code 1 - Restrict to Unit     Routine Programming     As clinically indicated     Status 15     Every 15 minutes.     Suicide precautions     Patients on Suicide Precautions should have a Combination Diet ordered that includes a Diet selection(s) AND a Behavioral Tray selection for Safe Tray - with utensils, or Safe Tray - NO utensils       Plan: [facility] will complete the initial psychosocial assessment. Upon stabilization, the patient will be assessed for discharge.   Rationale for change in precautions or plan: None.

## 2019-02-01 NOTE — PROGRESS NOTES
"   01/31/19 2100   General Information   Art Directive other (see comments)   Task Orientation    Task orientation skills calm and focused;follows instruction   Social Interaction   Social interaction skills maintains boundaries;shares appropriately   Product/Content   Product/Content image reflects current feelings;image reflects positive aspects   AT directive was a mindfulness exercise with the prompt to \"draw your heart.\" Goals of directive: emotional expression, identifying current feelings/personal strengths/motivation for change.    Pt was a positive participant, focused on task for the full duration of group. Pt finished image and briefly verbally processed with group.    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 Pt tied this part of this life into artwork and a personal self narrative.     Pts mood was calm, focused on task for the full duration of group.  "

## 2019-02-01 NOTE — PROGRESS NOTES
Pt was calm, visible in the lounge. Denies SI, SIB, anxiety 7 and depression 6.        02/01/19 1523   Behavioral Health   Hallucinations denies / not responding to hallucinations   Thinking poor concentration   Orientation person: oriented;place: oriented;date: oriented;time: oriented   Memory baseline memory   Insight poor   Judgement impaired   Eye Contact at examiner   Affect blunted, flat   Mood mood is calm   Physical Appearance/Attire appears stated age;attire appropriate to age and situation;neat   Hygiene well groomed   1. Wish to be Dead No   2. Non-Specific Active Suicidal Thoughts  No   Self Injury (denies)   Speech clear;coherent   Activities of Daily Living   Hygiene/Grooming independent   Oral Hygiene independent   Dress independent   Laundry with supervision   Room Organization independent

## 2019-02-02 PROCEDURE — 25000132 ZZH RX MED GY IP 250 OP 250 PS 637: Performed by: NURSE PRACTITIONER

## 2019-02-02 PROCEDURE — 12400001 ZZH R&B MH UMMC

## 2019-02-02 PROCEDURE — 25000132 ZZH RX MED GY IP 250 OP 250 PS 637: Performed by: PSYCHIATRY & NEUROLOGY

## 2019-02-02 RX ADMIN — QUETIAPINE FUMARATE 100 MG: 50 TABLET ORAL at 19:52

## 2019-02-02 RX ADMIN — NICOTINE POLACRILEX 4 MG: 4 GUM, CHEWING ORAL at 14:40

## 2019-02-02 RX ADMIN — PHENOBARBITAL 32.4 MG: 32.4 TABLET ORAL at 09:06

## 2019-02-02 RX ADMIN — PHENOBARBITAL 32.4 MG: 32.4 TABLET ORAL at 14:40

## 2019-02-02 RX ADMIN — SERTRALINE HYDROCHLORIDE 50 MG: 50 TABLET ORAL at 09:06

## 2019-02-02 RX ADMIN — NICOTINE POLACRILEX 4 MG: 4 GUM, CHEWING ORAL at 09:07

## 2019-02-02 RX ADMIN — MULTIPLE VITAMINS W/ MINERALS TAB 1 TABLET: TAB at 09:06

## 2019-02-02 RX ADMIN — BUPROPION HYDROCHLORIDE 150 MG: 150 TABLET, FILM COATED, EXTENDED RELEASE ORAL at 09:06

## 2019-02-02 RX ADMIN — ACETAMINOPHEN 650 MG: 325 TABLET, FILM COATED ORAL at 11:27

## 2019-02-02 RX ADMIN — NICOTINE POLACRILEX 4 MG: 4 GUM, CHEWING ORAL at 18:58

## 2019-02-02 RX ADMIN — NICOTINE POLACRILEX 4 MG: 4 GUM, CHEWING ORAL at 16:07

## 2019-02-02 RX ADMIN — HYDROXYZINE HYDROCHLORIDE 25 MG: 25 TABLET ORAL at 16:07

## 2019-02-02 RX ADMIN — PHENOBARBITAL 32.4 MG: 32.4 TABLET ORAL at 19:52

## 2019-02-02 ASSESSMENT — ACTIVITIES OF DAILY LIVING (ADL)
LAUNDRY: WITH SUPERVISION
LAUNDRY: UNABLE TO COMPLETE
HYGIENE/GROOMING: INDEPENDENT
DRESS: INDEPENDENT
ORAL_HYGIENE: INDEPENDENT
DRESS: INDEPENDENT
ORAL_HYGIENE: INDEPENDENT
HYGIENE/GROOMING: INDEPENDENT

## 2019-02-02 NOTE — PROGRESS NOTES
Patient spent the evening in the dinning area socializing and at times pacing the mcmullen. Reports improved mood and is happy to be seeking help. Denied depression and SI/SIB. Patient's parents visited and visit seems to go well.        02/01/19 2208   Behavioral Health   Hallucinations denies / not responding to hallucinations   Thinking poor concentration   Orientation person: oriented;place: oriented;date: oriented;time: oriented   Memory baseline memory   Insight insight appropriate to situation   Judgement impaired   Eye Contact at examiner   Affect full range affect   Mood mood is calm   Physical Appearance/Attire appears stated age   Hygiene well groomed   Suicidality other (see comments)  (denied )   1. Wish to be Dead No   2. Non-Specific Active Suicidal Thoughts  No   Self Injury (denied )   Elopement (none observed/mentioned )   Activity other (see comment)  (visible in lounge and pacing mcmullen)   Speech clear;coherent   Medication Sensitivity no stated side effects   Psychomotor / Gait balanced;steady   Psycho Education   Type of Intervention 1:1 intervention   Response participates, initiates socially appropriate   Hours 0.5   Treatment Detail (check in )   Safety   Suicidality Status 15   Activities of Daily Living   Hygiene/Grooming independent   Oral Hygiene independent   Dress independent   Laundry with supervision   Room Organization independent   Groups   Details (attended groups)

## 2019-02-02 NOTE — PLAN OF CARE
Patient appeared calm, was social with others and attending group activities. Patient reports improved mood, decreased anxiety and depression. Pt rates anxiety and depression at 4/10 and denies any thoughts of self harm.

## 2019-02-03 PROCEDURE — 12400001 ZZH R&B MH UMMC

## 2019-02-03 PROCEDURE — 25000132 ZZH RX MED GY IP 250 OP 250 PS 637: Performed by: PSYCHIATRY & NEUROLOGY

## 2019-02-03 PROCEDURE — G0177 OPPS/PHP; TRAIN & EDUC SERV: HCPCS

## 2019-02-03 PROCEDURE — 25000132 ZZH RX MED GY IP 250 OP 250 PS 637: Performed by: NURSE PRACTITIONER

## 2019-02-03 PROCEDURE — 94660 CPAP INITIATION&MGMT: CPT

## 2019-02-03 PROCEDURE — 40000275 ZZH STATISTIC RCP TIME EA 10 MIN

## 2019-02-03 RX ADMIN — PHENOBARBITAL 32.4 MG: 32.4 TABLET ORAL at 08:09

## 2019-02-03 RX ADMIN — MULTIPLE VITAMINS W/ MINERALS TAB 1 TABLET: TAB at 08:08

## 2019-02-03 RX ADMIN — PHENOBARBITAL 32.4 MG: 32.4 TABLET ORAL at 19:58

## 2019-02-03 RX ADMIN — NICOTINE POLACRILEX 4 MG: 4 GUM, CHEWING ORAL at 12:13

## 2019-02-03 RX ADMIN — NICOTINE POLACRILEX 4 MG: 4 GUM, CHEWING ORAL at 08:09

## 2019-02-03 RX ADMIN — HYDROXYZINE HYDROCHLORIDE 25 MG: 25 TABLET ORAL at 16:27

## 2019-02-03 RX ADMIN — QUETIAPINE FUMARATE 100 MG: 50 TABLET ORAL at 19:58

## 2019-02-03 RX ADMIN — HYDROXYZINE HYDROCHLORIDE 25 MG: 25 TABLET ORAL at 22:36

## 2019-02-03 RX ADMIN — PHENOBARBITAL 32.4 MG: 32.4 TABLET ORAL at 14:38

## 2019-02-03 RX ADMIN — SERTRALINE HYDROCHLORIDE 50 MG: 50 TABLET ORAL at 08:08

## 2019-02-03 RX ADMIN — NICOTINE POLACRILEX 4 MG: 4 GUM, CHEWING ORAL at 18:44

## 2019-02-03 RX ADMIN — BUPROPION HYDROCHLORIDE 150 MG: 150 TABLET, FILM COATED, EXTENDED RELEASE ORAL at 08:08

## 2019-02-03 NOTE — PROGRESS NOTES
The pt was a bit irritable this morning, because he had been under the impression that he would be discharging this shift. He was still very polite and appropriate, and was able to talk about his frustrations calmly. He accepted the situation and made it his goal for the shift to get things in place to discharge tomorrow. His parents visited, and the visit went well. He is well groomed, pleasant upon approach. He denies SI/SIB urges and hallucinations.     02/03/19 1428   Behavioral Health   Hallucinations denies / not responding to hallucinations   Thinking intact   Orientation person: oriented;place: oriented;date: oriented;time: oriented   Memory baseline memory   Insight admits / accepts;insight appropriate to situation   Judgement intact   Eye Contact at examiner   Affect full range affect   Mood mood is calm   Physical Appearance/Attire attire appropriate to age and situation;neat   Hygiene well groomed   Suicidality other (see comments)  (pt denies)   1. Wish to be Dead No   2. Non-Specific Active Suicidal Thoughts  No   Self Injury other (see comment)  (pt denied)   Elopement Statements about wanting to leave  (was hoping to discharge today)   Activity other (see comment)  (visible, social, attended community meeting)   Speech clear;coherent   Medication Sensitivity no stated side effects   Psychomotor / Gait balanced;steady

## 2019-02-03 NOTE — PROGRESS NOTES
"   02/02/19 2000   Behavioral Health   Hallucinations denies / not responding to hallucinations   Thinking intact   Orientation person: oriented;place: oriented   Memory baseline memory   Insight insight appropriate to situation   Judgement impaired   Eye Contact at examiner   Affect full range affect   Mood mood is calm   Physical Appearance/Attire neat   Hygiene well groomed   Suicidality other (see comments)  (denies )   1. Wish to be Dead No   2. Non-Specific Active Suicidal Thoughts  No   Self Injury other (see comment)  (denies)   Activity other (see comment)  (was visible in the milieu and socialized with others )   Speech clear;coherent   Activities of Daily Living   Hygiene/Grooming independent   Oral Hygiene independent   Dress independent   Laundry with supervision   Room Organization independent       Pt denied SI and SIB.  Pt reported feeling \"good.\"  Pt seems calm, ate supper, visible in the milieu and socialized with others.    "

## 2019-02-03 NOTE — PLAN OF CARE
Patient attended weekend relaxation OT group. Breathing exercises used as a warm up activity to facilitate calm and focus. Discussion about mindfulness and meditation was followed by a guided imagery meditation to imagine a safe and peaceful place. In order to promote a more concrete modality for focusing on relaxation and imagery, a sketching activity was used to retain focus on each individual's imagined safe place. Patient was engaged in breathing exercise. He appeared attentive during guided imagery but mir something he did not imagine. When it was his turn to share, he began talking about different aspects of associations to the memory of what he pictured. He required moderate prompting to describe a place he imagined with different sensory experiences. Overall, he demonstrated little attention to follow verbal instructions accurately for an abstract exercise. He was social with others talking about associations from their locations. He did require cuing to maintain focus on group topic.

## 2019-02-04 VITALS
DIASTOLIC BLOOD PRESSURE: 68 MMHG | WEIGHT: 194 LBS | SYSTOLIC BLOOD PRESSURE: 137 MMHG | HEART RATE: 71 BPM | TEMPERATURE: 97.9 F | OXYGEN SATURATION: 99 % | BODY MASS INDEX: 29.5 KG/M2 | RESPIRATION RATE: 16 BRPM

## 2019-02-04 PROCEDURE — 25000132 ZZH RX MED GY IP 250 OP 250 PS 637: Performed by: PSYCHIATRY & NEUROLOGY

## 2019-02-04 PROCEDURE — 99238 HOSP IP/OBS DSCHRG MGMT 30/<: CPT | Performed by: PSYCHIATRY & NEUROLOGY

## 2019-02-04 PROCEDURE — 25000132 ZZH RX MED GY IP 250 OP 250 PS 637: Performed by: NURSE PRACTITIONER

## 2019-02-04 RX ORDER — QUETIAPINE FUMARATE 50 MG/1
50-100 TABLET, FILM COATED ORAL
Qty: 15 TABLET | Refills: 0 | Status: SHIPPED | OUTPATIENT
Start: 2019-02-04 | End: 2019-03-06

## 2019-02-04 RX ORDER — BUPROPION HYDROCHLORIDE 150 MG/1
150 TABLET ORAL DAILY
Qty: 30 TABLET | Refills: 0 | Status: SHIPPED | OUTPATIENT
Start: 2019-02-05 | End: 2019-03-07

## 2019-02-04 RX ORDER — PHENOBARBITAL 16.2 MG/1
TABLET ORAL
Qty: 19 TABLET | Refills: 0 | Status: SHIPPED | OUTPATIENT
Start: 2019-02-04

## 2019-02-04 RX ORDER — PHENOBARBITAL 16.2 MG/1
TABLET ORAL
Qty: 19 TABLET | Refills: 0 | Status: SHIPPED | OUTPATIENT
Start: 2019-02-04 | End: 2019-02-04

## 2019-02-04 RX ADMIN — PHENOBARBITAL 32.4 MG: 32.4 TABLET ORAL at 07:38

## 2019-02-04 RX ADMIN — NICOTINE POLACRILEX 4 MG: 4 GUM, CHEWING ORAL at 07:38

## 2019-02-04 RX ADMIN — SERTRALINE HYDROCHLORIDE 50 MG: 50 TABLET ORAL at 07:38

## 2019-02-04 RX ADMIN — NICOTINE POLACRILEX 4 MG: 4 GUM, CHEWING ORAL at 10:25

## 2019-02-04 RX ADMIN — BUPROPION HYDROCHLORIDE 150 MG: 150 TABLET, FILM COATED, EXTENDED RELEASE ORAL at 07:38

## 2019-02-04 RX ADMIN — MULTIPLE VITAMINS W/ MINERALS TAB 1 TABLET: TAB at 07:38

## 2019-02-04 ASSESSMENT — ACTIVITIES OF DAILY LIVING (ADL)
HYGIENE/GROOMING: INDEPENDENT
DRESS: STREET CLOTHES;INDEPENDENT
ORAL_HYGIENE: INDEPENDENT

## 2019-02-04 NOTE — DISCHARGE INSTRUCTIONS
Behavioral Discharge Planning and Instructions  Summary:  You were admitted on 1/31/2019  due to Suicidal Ideations and Chemical Use Issues.  You were treated by Dr. Willis Cross MD and discharged on 02/04/2019 from [facility] to Home at [address] [place], [place] [id number].     Principal Diagnosis:   Major depressive disorder, recurrent, severe  Polysubstance dependence (benzodiazepines, alcohol)    Health Care Follow-up Appointments:   Outpatient Therapy  Thursday, February 7th at 9:00am with [facility]   [address] [place], [place] [id number]  Phone: [phone]  The agency asks that you arrive at least 15-20 minutes prior to the start of your appointment to complete new patient paperwork. If you need to reschedule, please do so by calling the number listed above at least 24 hours in advance.     Primary Care  Monday, February 25th at 9:30am [place] [address]  Phone: [phone]  The agency asks that you arrive at least 15-20 minutes prior to the start of your appointment to complete new patient paperwork. If you need to reschedule, please do so by calling the number listed above at least 24 hours in advance.     Medication Management  Tuesday, March 26th at 3:40pm with [facility]  [address] [place] [id number]  Phone: [phone]  The agency asks that you complete the new patient paperwork on their website at [email or web address] or arrive at least one hour prior to the start of your appointment to complete new patient paperwork. If you need to reschedule, please do so by calling the number listed above at least 24 hours in advance.     Additional Instructions for Phenobarbital:  --- I recommended that you remain hospitalized until completion of taper, though you declined.   --- I have discussed the risks associated with Phenobarbital use, including risk of  respiratory depression and possibly death if you overuse/abuse this medication, combine it with alcohol, other benzodiazepines and/or opiates.   --- I strongly recommend Chemical Dependency treatment to treat benzodiazepine and alcohol use.   --- Specific taper instructions:        2/4: Take 2 tabs in the afternoon and 2 tabs at bedtime        2/5: Take 2 tabs in the morning, 1 tab in the afternoon, and 2 tabs at bedtime         2/6: Take 1 tab in the morning, 1 tab in the afternoon, and 2 tabs at bedtime         2/7: Take 1 tab three times today        2/8: Take 1 tab twice today        2/9: Take 1 tab today then stop    Attend all scheduled appointments with your outpatient providers. Call at least 24 hours in advance if you need to reschedule an appointment to ensure continued access to your outpatient providers.   Major Treatments, Procedures and Findings:  You were provided with: a psychiatric assessment, medication evaluation and/or management, group therapy and milieu management    Symptoms to Report: feeling more aggressive, increased confusion, losing more sleep, mood getting worse or thoughts of suicide    Early warning signs can include: increased depression or anxiety sleep disturbances increased thoughts or behaviors of suicide or self-harm  increased unusual thinking, such as paranoia or hearing voices    Safety and Wellness:  Take all medicines as directed.  Make no changes unless your doctor suggests them. Follow treatment recommendations. Refrain from alcohol and non-prescribed drugs.  Items could include:    - Firearms  - Medicines (both prescribed and over-the-counter)  - Knives and other sharp objects  - Ropes and like materials  - Car keys  If there is a concern for safety, call 911. If there is a concern for safety, call 911.    Resources:   [facility] Crisis (COPE) Response - Adult [phone]  [facility] Mobile Crisis Response Team  "(CRT):  [phone] or [phone]   Crisis Intervention: 725.395.5288 or 873-102-1861 (TTY: 219.594.2286).  Call anytime for help.  National [place] on Mental Illness (www.mn.deidra.org): 752.529.3543 or 947-731-2405.  Alcoholics Anonymous (www.alcoholics-anonymous.org): Check your phone book for your local chapter.  Suicide Awareness Voices of Education (SAVE) (www.save.org): 290-224-GYFK (4051)  National Suicide Prevention Line (www.mentalhealthmn.org): 972-139-OZTS (9819)  Mental Health Consumer/Survivor Network of [place] (www.mhcsn.net): 686.474.3991 or 499-722-4485  Mental Health Association [place] (www.mentalhealth.org): 391.152.3343 or 810-658-9238  Self- Management and Recovery Training., [employer]-- Toll free: 891.936.7251  www.[employer]  Text 4 Life: txt \"LIFE\" to 92350 for immediate support and crisis intervention      The treatment team has appreciated the opportunity to work with you.     If you have any questions or concerns our unit number is [phone].  You may be receiving a follow-up phone call within the next three days from a representative from behavioral health.          "

## 2019-02-04 NOTE — PLAN OF CARE
Discharged to home with a 30 day supply of medication.  He denied being suicidal and was cooperative with all discharge procedures.  He has outpatient appointments set up for therapy and medication management.  He remains on a phenobarbital taper.  It is recommended that he attend chemical dependency treatment.

## 2019-02-04 NOTE — PROGRESS NOTES
Nicoller spoke with the patient's mother, Kassandra Bella ([phone]) who stated that she has no concerns about the patient and his able to stay with her and her  following discharge. Writer stated that she would let the covering attending know.      scheduled the patient an MTM referral for tomorrow at 10:30am before he walked off the unit. He was informed that they MTM staff will call him at that day and time.

## 2019-02-04 NOTE — PLAN OF CARE
Pt presents with full range affect. PT was visible in milieu and social with others. Pt expressed his frustration that he could not be discharged today stating that he is scheduled for work tomorrow. Pt is requesting to be seen by the physician first thing in the morning so that he can discharge in time to go to work. Denies SI/SIB/AH/VH.

## 2019-02-04 NOTE — PROGRESS NOTES
"Pt was supposed to use CPAP tonight. Respiratory staff brought the CPAP machine up and fitted the pt. When pt was informed that a staff will have to sit with him throughout the night due to the CPAP he said then he doesn't want it. Pt said \"I don't want someone watching me all night\". Pt said he will be fine since he doesn't have a roommate. He said he was worried he would bother the roommate with snoring without a the CPAP.   "

## 2019-02-04 NOTE — DISCHARGE SUMMARY
"Psychiatric Discharge Summary    Tyler Bella MRN# [id number]   Age: 28 year old YOB: 1990     Date of Admission:  1/31/2019  Date of Discharge:  2/4/2019  Admitting Physician:  Willis Cross MD  Discharge Physician:  Yana Rojo MD (Contact: [phone])         Event Leading to Hospitalization:   Per H&P:    The patient is a 28-year-old  male going through a tough period in his life.  He is going through a divorce with his wife.  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  Reports work-related issues and chemical addiction issues.  He reports that he has been using Valium, ketamine, Klonopin along with alcohol.  He said that at times he uses opiates because he does not want to become addicted to benzodiazepines (?!).  He reports that he also uses anabolic steroids because he is a . He reports that he is officially prescribed Ambien 10 mg, Zoloft 50 mg daily by a prescriber whose first name is Ashlie. He does not remember the full name.  He says that he used to be on a higher dose of Zoloft but it caused side effects.  He also has a therapist, but does not see one now.  He reports that stresses became so severe that he finally overdosed on a combination of Klonopin, ketamine, and alcohol.  He then called his boyfriend and then ended up in the Emergency Department.          See Admission note by Willis Cross MD on 1/31/19 for additional details.          Diagnoses:     Major depressive disorder, recurrent, severe, rule out substance-induced mood disorder with depressive features.    Polysubstance dependence (benzodiazepines, alcohol).    Rule out opiate use disorder.    Rule out amphetamines, rule out stimulant use disorder (amphetamines).          Labs:     Recent Results (from the past 168 hour(s))   EKG 12 lead    Collection Time: 01/31/19  2:21 AM   Result Value Ref Range    Interpretation ECG Click View Image " link to view waveform and result    CBC with platelets differential    Collection Time: 01/31/19  2:44 AM   Result Value Ref Range    WBC 8.4 4.0 - 11.0 10e9/L    RBC Count 5.65 4.4 - 5.9 10e12/L    Hemoglobin 16.3 13.3 - 17.7 g/dL    Hematocrit 46.1 40.0 - 53.0 %    MCV 82 78 - 100 fl    MCH 28.8 26.5 - 33.0 pg    MCHC 35.4 31.5 - 36.5 g/dL    RDW 13.3 10.0 - 15.0 %    Platelet Count 289 150 - 450 10e9/L    Diff Method Automated Method     % Neutrophils 46.8 %    % Lymphocytes 42.9 %    % Monocytes 7.2 %    % Eosinophils 2.1 %    % Basophils 0.9 %    % Immature Granulocytes 0.1 %    Nucleated RBCs 0 0 /100    Absolute Neutrophil 3.9 1.6 - 8.3 10e9/L    Absolute Lymphocytes 3.6 0.8 - 5.3 10e9/L    Absolute Monocytes 0.6 0.0 - 1.3 10e9/L    Absolute Eosinophils 0.2 0.0 - 0.7 10e9/L    Absolute Basophils 0.1 0.0 - 0.2 10e9/L    Abs Immature Granulocytes 0.0 0 - 0.4 10e9/L    Absolute Nucleated RBC 0.0    Comprehensive metabolic panel    Collection Time: 01/31/19  2:44 AM   Result Value Ref Range    Sodium 140 133 - 144 mmol/L    Potassium 3.6 3.4 - 5.3 mmol/L    Chloride 106 94 - 109 mmol/L    Carbon Dioxide 24 20 - 32 mmol/L    Anion Gap 10 3 - 14 mmol/L    Glucose 64 (L) 70 - 99 mg/dL    Urea Nitrogen 19 7 - 30 mg/dL    Creatinine 1.20 0.66 - 1.25 mg/dL    GFR Estimate 81 >60 mL/min/[1.73_m2]    GFR Estimate If Black >90 >60 mL/min/[1.73_m2]    Calcium 9.1 8.5 - 10.1 mg/dL    Bilirubin Total 0.8 0.2 - 1.3 mg/dL    Albumin 4.4 3.4 - 5.0 g/dL    Protein Total 7.7 6.8 - 8.8 g/dL    Alkaline Phosphatase 61 40 - 150 U/L    ALT 36 0 - 70 U/L    AST 32 0 - 45 U/L   Acetaminophen level    Collection Time: 01/31/19  2:44 AM   Result Value Ref Range    Acetaminophen Level <2 mg/L   Salicylate level    Collection Time: 01/31/19  2:44 AM   Result Value Ref Range    Salicylate Level <2 mg/dL   Alcohol breath test POCT    Collection Time: 01/31/19  2:45 AM   Result Value Ref Range    Alcohol Breath Test 0.059 (A) 0.00 - 0.01    Drug abuse screen 6 urine (chem dep)    Collection Time: 01/31/19  2:47 AM   Result Value Ref Range    Amphetamine Qual Urine Positive (A) NEG^Negative    Barbiturates Qual Urine Negative NEG^Negative    Benzodiazepine Qual Urine Positive (A) NEG^Negative    Cannabinoids Qual Urine Negative NEG^Negative    Cocaine Qual Urine Negative NEG^Negative    Ethanol Qual Urine Positive (A) NEG^Negative    Opiates Qualitative Urine Negative NEG^Negative   EKG 12 lead    Collection Time: 01/31/19  5:31 AM   Result Value Ref Range    Interpretation ECG Click View Image link to view waveform and result    EKG 12-lead, complete    Collection Time: 01/31/19  1:17 PM   Result Value Ref Range    Interpretation ECG Click View Image link to view waveform and result    Lipid panel reflex to direct LDL    Collection Time: 02/01/19  7:45 AM   Result Value Ref Range    Cholesterol 148 <200 mg/dL    Triglycerides 91 <150 mg/dL    HDL Cholesterol 41 >39 mg/dL    LDL Cholesterol Calculated 89 <100 mg/dL    Non HDL Cholesterol 107 <130 mg/dL          Consults:   Consultation received from internal medicine on 1/31/19:    Medicine Note     Contacted by nursing regarding patient's abnormal ECG with widened QRS interval of 116.      Per chart review, patient is s/p ingestion of 10 pills of Valium, Ketamine 70mg injection and Klonopin 4mg this AM at 0000 stated to be a suicide attempt. Per chart review, ED contacted poison control. Tylenol salicylate levels were drawn and negative.  EKG was done which showed slightly prolonged QRS with a QRS duration of 116 ms. This is longer than QRS duration on previous EKG.  However, he denied overdose on any of his other medications.  Poison center felt that his presentation was not consistent with other medication overdoses, such as would cause QRS prolongation.  Poison control felt he would be somnolent, have tachycardia, etc. Poison control recommended monitoring for at least 4 hours postingestion,  repeating EKG to assess for any change in the QRS duration.  Of note, the patient's initial EKG has apparent left ventricular hypertrophy on EKG, has some deep T wave inversions laterally on initial EKG.  He also has slight ST elevation in V4 through V6, though has no symptoms at all. ED provider discussed with Cardiology fellow, Dr. Jenkins, who reviewed the EKG.  He felt that the ST elevation was due to early repolarization.  He did not think that the EKG was at all concerning.  On repeat EKG, the patient has similar findings, but also has T wave inversions now noted inferiorly as well as in leads V2 and V3. ED provider did speak with Dr. Jenkins again.  He feels that this EKG is fairly consistent with the previous EKG, and likely the variation from the first of the second EKG today was due to lead placement.  The patient was asymptomatic in the ED without chest pain or shortness of breath.  Dr. Jenkins did not feel that any further acute evaluation was required at that time, and that this is not likely an acute finding.       After evaluation of the ECG; I personally called poison control at 1600. They discussed that they do not feel the ECG findings are consistent with the ingestion. They recommended further investigation of the ECG findings.      I discussed the case with Cardiology fellow on call this PM. Cardiology discussed that this finding can be seen in otherwise young males if they have LVF/enlarged heart due being physically fit. Reviewed PMHx and blood work done today as well. Cardiology recommends obtaining ECHO tomorrow for further assessment. No acute work-up necessary this evening.      In the meantime, please order STAT ECG if patient has acute chest pain/SOB, dizziness, lightheadedness overnight.      Milly Turpin PA-C  Hospitalist Service    IM follow up note dated 2/1/19:    A/P:  Abnormal ECG changes:   -ECHO complete today was normal with LVEF 60-65%; no wall motion abnormalities or valvular  "disease  -DW Cardiology on 1/31; like the ECG changes are due to early repolarization which can be seen in young fit males; pt is asymptomatic.  -Patient cleared by poison control 1/31   -Will have patient f/u with Cardiology and PCP as outpatient   -No further acute work-up necessary   -Please order STAT ECG if patient has acute CP, SOB, dizziness or lightheadedness      No further medical intervention is required at this time. Medicine signing off. Please feel free to call with any questions.      Milly Turpin PA-C  Hospitalist Service           Hospital Course:   Tyler Bella was admitted to [facility] with attending Willis Cross MD as a voluntary patient. The patient was placed under status 15 (15 minute checks) to ensure patient safety.     On admission, patient identified recent stressors contributing to worsening depression, including CD issues and divorce. Ambien was stopped and Seroquel  mg at bedtime prn was initiated to target initial insomnia. Zoloft and Wellbutrin were resumed. Phenobarbital was initiated to manage benzodiazepine withdrawal. R/B/A of medications discussed with patient and he expressed understanding. Discussed risks associated with combined use of opiates or alcohol in context of benzodiazepines, including risk of respiratory depression and possibly death. Per Dr. Cross's note, patient repeatedly declined recommendation to pursue CD treatment, however, he was open to seeking CD counseling.     Per Dr. Cross's note dated 2/1/19: \"Will decrease Phenobarbital. There is a good chance that patient would need to be discharged on Phenobarbital to continue taper at home. I informed him that I strongly recommend him to leave only after he sees doctor on Monday and to ask to be seen first. He promised to do that, however, if demands to leave over weekend, has not signs of withdrawal and contracts for safety, should be discharged AMA.\"    Patient was willing to " "remain hospitalized through the weekend. During that time, he consistently denied SI, SIB, and HI. He participated in groups. He noted overall improvement in his mood. At time of discharge, patient did agree to engage in AA/NA meetings. In the past, he has been able to maintain extended periods of sobriety (up to 4 years) with support of AA/NA meetings.     On day of discharge, patient again denied SI. When asked about suicide attempt, he said \"I didn't intend on killing myself, but I was afraid I wouldn't have gotten admitted if I didn't ingest something. I just wanted some help.\"    Tyler Bella did participate in groups and was visible in the milieu.     The patient's symptoms of depression improved. SI resolved.     Tyler Bella was released to home. At the time of discharge Tyler Bella was determined to not be a danger to himself or others.     Patient's mother was contacted on day of discharge. She denied any imminent safety concerns. She also stated that patient will be staying with his parents following discharge.     SUICIDE RISK ASSESSMENT:  Today Tyler Bella denies SI, SIB, and HI.  He has notable risk factors for self-harm including previous suicide attempt and substance use / pending treatment.  However, risk is mitigated by no plan or intent, no access to lethal means, describes a safety plan, h/o seeking help when needed, symptom improvement, future oriented, feeling hopeful, none to minimal alcohol use , commitment to family, good social support  , stable housing and good job situation.  Based on all available evidence he does not appear to be at imminent risk for self-harm therefore does not meet criteria for a 72-hr hold/ involuntary hospitalization.  However, based on degree of symptoms substance use treatment was recommended which the pt did not agree to. However, he did agree to engage in AA/NA meetings, as well as establish care with a CD counselor. Patient also agreed to call " 911/present to ED if any imminent safety concerns arise, including re-emergence of SI. Patient was provided with crisis resources at the time of discharge. Patient agreed to further reduce risk of self-harm by completely abstaining from illicit substances and alcohol, and agreed to remain medication adherent. Expressed understanding of the risks associated with alcohol use, illicit drug use, and medication non-adherence.           Discharge Medications:     Current Discharge Medication List      START taking these medications    Details   buPROPion (WELLBUTRIN XL) 150 MG 24 hr tablet Take 1 tablet (150 mg) by mouth daily  Qty: 30 tablet, Refills: 0    Associated Diagnoses: Depression, unspecified depression type      PHENobarbital (LUMINAL) 16.2 MG tablet 2/4: Take 2 tabs in the afternoon and 2 tabs at bedtime  2/5: Take 2 tabs in the morning, 1 tab in the afternoon, and 2 tabs at bedtime  2/6: Take 1 tab in the morning, 1 tab in the afternoon, and 2 tabs at bedtime  2/7: Take 1 tab three times daily  2/8: Take 1 tab twice daily  2/9: Take 1 tab daily then stop  Qty: 19 tablet, Refills: 0    Associated Diagnoses: Benzodiazepine abuse (H)      QUEtiapine (SEROQUEL) 50 MG tablet Take 1-2 tablets ( mg) by mouth nightly as needed (insomnia)  Qty: 15 tablet, Refills: 0    Associated Diagnoses: Depression, unspecified depression type         CONTINUE these medications which have CHANGED    Details   sertraline (ZOLOFT) 50 MG tablet Take 1 tablet (50 mg) by mouth daily  Qty: 30 tablet, Refills: 0    Associated Diagnoses: Depression, unspecified depression type         CONTINUE these medications which have NOT CHANGED    Details   multivitamin, therapeutic with minerals (MULTI-VITAMIN) TABS Take 1 tablet by mouth daily  Qty: 100 tablet, Refills: 3         STOP taking these medications       zolpidem (AMBIEN) 10 MG tablet Comments:   Reason for Stopping:                  Psychiatric Examination:   Appearance:  awake,  alert and adequately groomed  Attitude:  cooperative  Eye Contact:  good  Mood:  better  Affect:  appropriate and in normal range and mood congruent  Speech:  clear, coherent  Psychomotor Behavior:  no evidence of tardive dyskinesia, dystonia, or tics  Thought Process:  logical, linear and goal oriented  Associations:  no loose associations  Thought Content:  no evidence of suicidal ideation or homicidal ideation and no evidence of psychotic thought  Insight:  good  Judgment:  fair  Oriented to:  time, person, and place  Attention Span and Concentration:  intact  Recent and Remote Memory:  intact  Language: Able to name objects, Able to repeat phrases and Able to read and write  Fund of Knowledge: appropriate  Muscle Strength and Tone: normal  Gait and Station: Normal         Discharge Plan:     Health Care Follow-up Appointments:   Outpatient Therapy  Thursday, February 7th at 9:00am with [facility]   [address] [place], [place] [id number]  Phone: [phone]  The agency asks that you arrive at least 15-20 minutes prior to the start of your appointment to complete new patient paperwork. If you need to reschedule, please do so by calling the number listed above at least 24 hours in advance.     Primary Care  Monday, February 25th at 9:30am [place] [address]  Phone: [phone]  The agency asks that you arrive at least 15-20 minutes prior to the start of your appointment to complete new patient paperwork. If you need to reschedule, please do so by calling the number listed above at least 24 hours in advance.     Medication Management  Tuesday, March 26th at 3:40pm with [facility]  [address] [place] [address]  Phone: [phone]  The agency asks that you complete the new patient paperwork on their website at [email or web address] or arrive at least one hour prior to the start of  your appointment to complete new patient paperwork. If you need to reschedule, please do so by calling the number listed above at least 24 hours in advance.     Additional Instructions for Phenobarbital:  --- I recommended that you remain hospitalized until completion of taper, though you declined.   --- I have discussed the risks associated with Phenobarbital use, including risk of respiratory depression and possibly death if you overuse/abuse this medication, combine it with alcohol, other benzodiazepines and/or opiates.   --- I strongly recommend Chemical Dependency treatment to treat benzodiazepine and alcohol use.   --- Specific taper instructions:        2/4: Take 2 tabs in the afternoon and 2 tabs at bedtime        2/5: Take 2 tabs in the morning, 1 tab in the afternoon, and 2 tabs at bedtime         2/6: Take 1 tab in the morning, 1 tab in the afternoon, and 2 tabs at bedtime         2/7: Take 1 tab three times today        2/8: Take 1 tab twice today        2/9: Take 1 tab today then stop    Attend all scheduled appointments with your outpatient providers. Call at least 24 hours in advance if you need to reschedule an appointment to ensure continued access to your outpatient providers.   Major Treatments, Procedures and Findings:  You were provided with: a psychiatric assessment, medication evaluation and/or management, group therapy and milieu management    Symptoms to Report: feeling more aggressive, increased confusion, losing more sleep, mood getting worse or thoughts of suicide    Early warning signs can include: increased depression or anxiety sleep disturbances increased thoughts or behaviors of suicide or self-harm  increased unusual thinking, such as paranoia or hearing voices    Safety and Wellness:  Take all medicines as directed.  Make no changes unless your doctor suggests them. Follow treatment recommendations. Refrain from alcohol and non-prescribed drugs.  Items could include:    -  "Firearms  - Medicines (both prescribed and over-the-counter)  - Knives and other sharp objects  - Ropes and like materials  - Car keys  If there is a concern for safety, call 911. If there is a concern for safety, call 911.    Resources:   [facility] Crisis (COPE) Response - Adult [phone]  [facility], [facility] Mobile Crisis Response Team (CRT):  [phone] or [phone]   Crisis Intervention: 614.602.1068 or [phone] (TTY: 301.397.6450).  Call anytime for help.  National [place] on Mental Illness (www.mn.deidra.org): 579.905.9740 or 626-207-5713.  Alcoholics Anonymous (www.alcoholics-anonymous.org): Check your phone book for your local chapter.  Suicide Awareness Voices of Education (SAVE) (www.save.org): 954-725-ZBPJ (9183)  National Suicide Prevention Line (www.mentalhealthmn.org): 617-571-AEVS (4181)  Mental Health Consumer/Survivor Network of [place] (www.mhcsn.net): 235.686.2693 or 610-443-8419  Mental Health Association of [place] (www.mentalhealth.org): 301.475.3209 or 301-271-8314  Self- Management and Recovery Training., SMART-- Toll free: 371.377.4606  [email or web address]  Text 4 Life: txt \"LIFE\" to 61783 for immediate support and crisis intervention    The treatment team has appreciated the opportunity to work with you.     If you have any questions or concerns our unit number is [phone].  You may be receiving a follow-up phone call within the next three days from a representative from behavioral health.      Attestation:  The patient has been seen and evaluated by me,  Yana Rojo MD    "

## 2019-02-05 ENCOUNTER — TELEPHONE (OUTPATIENT)
Dept: PEDIATRICS | Facility: CLINIC | Age: 29
End: 2019-02-05

## 2019-02-05 ENCOUNTER — ALLIED HEALTH/NURSE VISIT (OUTPATIENT)
Dept: PHARMACY | Facility: CLINIC | Age: 29
End: 2019-02-05
Payer: COMMERCIAL

## 2019-02-05 DIAGNOSIS — F33.1 MODERATE EPISODE OF RECURRENT MAJOR DEPRESSIVE DISORDER (H): Primary | ICD-10-CM

## 2019-02-05 DIAGNOSIS — F41.1 GAD (GENERALIZED ANXIETY DISORDER): ICD-10-CM

## 2019-02-05 DIAGNOSIS — G47.09 OTHER INSOMNIA: ICD-10-CM

## 2019-02-05 PROCEDURE — 99605 MTMS BY PHARM NP 15 MIN: CPT | Performed by: PHARMACIST

## 2019-02-05 PROCEDURE — 99607 MTMS BY PHARM ADDL 15 MIN: CPT | Performed by: PHARMACIST

## 2019-02-05 NOTE — TELEPHONE ENCOUNTER
Please contact patient for In-patient follow up.  [phone] (home)     Visit date: 01/31 - D/C'd 02/04/2019  Diagnosis listed:Intentional Drug Overdose, Initial Encounter (H), Abnormal Electrocardiogram  Number of visits in past 12 months:ED0/ IP 1

## 2019-02-05 NOTE — TELEPHONE ENCOUNTER
ED / Discharge Outreach Protocol    Patient Contact    Attempt # 1    Was call answered?  No.  Left message on voicemail with information to call me back.  ILIANA Daniel RN

## 2019-02-05 NOTE — PROGRESS NOTES
"SUBJECTIVE/OBJECTIVE:                Tyler Bella is a 28 year old male called for a transitions of care visit.  He was discharged from [facility] on 2/4/19 for suicidal thoughts.     Chief Complaint: \"I'm feeling pretty good\"    Allergies/ADRs: Reviewed in Epic  Tobacco: History of tobacco dependence - quit 2016   Alcohol: not currently using  Activity: competitive   PMH: Reviewed in Epic    Medication Adherence/Access:  no issues reported    Depression/Anxiety: Pt was recently hospitalized with worsening depression and anxiety, exacerbated by going through a divorce with his wife.  Worsening mood and substance use (alcohol, opioids, benzodiazepines) culminated in ingestion of diazepam and clonazepam with ketamine injection in effort to seek help.  Pt denied attempt to end his life.  He is currently taking sertraline 50mg daily and bupropion ER 150mg daily and is reportedly tolerating them well.  He is also completing a phenobarbital taper for benzo/opioid withdrawal, with last dose on 2/9/19.  Patient indicated that he is knowledgeable about instructions and feels comfortable with the dose.  Denied any side effects.  He reported that mood is overall improved and denied any thoughts of self harm.  He felt that his mood was even mildly elevated and attributed it to being happy about hospital discharge.  He endorsed general anxiety, but feels that its intensity matches what would be expected of going through a divorce.  He feels that the anxiety is manageable and is looking forward to starting outpatient therapy for additional support.    Insomnia: Pt reported having chronic insomnia, for which he has used alcohol and benzodiazepines to treat in the past.  He is currently taking quetiapine 50-100mg at bedtime, which is helpful.  He reported having no intention to use alcohol or benzodiazepines in that way in the future.  He had a four year span of non-use and feels motivated to do that again.  He " denied any cravings for either substance and feels stable.  He does notice increased appetite after quetiapine dose and typically has snack before bed.  He is not bothered by the change and feels it will be balanced with his active lifestyle.    Supplement: Pt is taking multivitamin daily without issues.    Today's Vitals: There were no vitals taken for this visit. phone visit    ASSESSMENT:                 Current medications were reviewed today.      Medication Adherence: excellent, no issues identified    Depression/Anxiety: Symptoms seem improved.  Continue current medication and follow up with scheduled therapy and psychiatry appointments at [facility], per AVS.  Pt has information and had no further questions.  Reviewed risk for respiratory depression with phenobarbital and benzodiazepines/alcohol combination.     Insomnia: Improving.  Continue current medication.  Reviewed metabolic monitoring for ongoing treatment with quetiapine. Baseline glucose and lipid panel were WNL on 2/1/19.   Supplement: Continue current medication.    PLAN:                  Post Discharge Medication Reconciliation Status: discharge medications reconciled, continue medications without change.    1. Continue current medications and follow up with outpatient providers.    I spent 30 minutes with this patient today. A copy of the visit note was provided to the patient's psychiatry provider.    Will follow up as needed. Pt declined follow up at this time.    The patient declined a summary of these recommendations as an after visit summary.    Yolis Sparks, Cathy  Medication Therapy Management Pharmacist  [facility]  Phone: [phone]

## 2019-02-06 NOTE — TELEPHONE ENCOUNTER
"ED / Discharge Outreach Protocol    Patient Contact    Attempt # 2    Was call answered?  Yes.  \"May I please speak with <patient name>\"  Is patient available?   Yes    Hospital/TCU/ED for chronic condition Discharge Protocol    \"Hi, my name is Sweta Smalls, a registered nurse, and I am calling from [facility].  I am calling to follow up and see how things are going for you after your recent emergency visit/hospital/TCU stay.\"    Tell me how you are doing now that you are home?\" doing well      Discharge Instructions    \"Let's review your discharge instructions.  What is/are the follow-up recommendations?  Pt. Response: will start therapy tomorrow, psychiatry-seeing them soon and has an appointment scheduled with new PCP-patient moved to [place] and will not be coming to the [facility] any longer-I noted this on the demographics.    \"Has an appointment with your primary care provider been scheduled?\"   Yes. (confirm)    \"When you see the provider, I would recommend that you bring your medications with you.\"    Medications    \"Tell me what changed about your medicines when you discharged?\"    Changes to chronic meds?    0-1    \"What questions do you have about your medications?\"    None     New diagnoses of heart failure, COPD, diabetes, or MI?    No              Medication reconciliation completed? Yes  Was MTM referral placed (*Make sure to put transitions as reason for referral)?   No    Call Summary    \"What questions or concerns do you have about your recent visit and your follow-up care?\"     none    \"If you have questions or things don't continue to improve, we encourage you contact us through the main clinic number (give number).  Even if the clinic is not open, triage nurses are available 24/7 to help you.     We would like you to know that our clinic has extended hours (provide information).  We also have urgent care (provide details on closest location and hours/contact info)\"      \"Thank " "you for your time and take care!\"           "

## 2019-09-30 ENCOUNTER — HEALTH MAINTENANCE LETTER (OUTPATIENT)
Age: 29
End: 2019-09-30

## 2021-01-15 ENCOUNTER — HEALTH MAINTENANCE LETTER (OUTPATIENT)
Age: 31
End: 2021-01-15

## 2021-10-24 ENCOUNTER — HEALTH MAINTENANCE LETTER (OUTPATIENT)
Age: 31
End: 2021-10-24

## 2022-02-13 ENCOUNTER — HEALTH MAINTENANCE LETTER (OUTPATIENT)
Age: 32
End: 2022-02-13

## 2022-10-15 ENCOUNTER — HEALTH MAINTENANCE LETTER (OUTPATIENT)
Age: 32
End: 2022-10-15

## 2023-03-26 ENCOUNTER — HEALTH MAINTENANCE LETTER (OUTPATIENT)
Age: 33
End: 2023-03-26